# Patient Record
Sex: MALE | Race: WHITE | NOT HISPANIC OR LATINO | Employment: OTHER | URBAN - METROPOLITAN AREA
[De-identification: names, ages, dates, MRNs, and addresses within clinical notes are randomized per-mention and may not be internally consistent; named-entity substitution may affect disease eponyms.]

---

## 2022-03-30 ENCOUNTER — MEDICAL CORRESPONDENCE (OUTPATIENT)
Dept: HEALTH INFORMATION MANAGEMENT | Facility: CLINIC | Age: 77
End: 2022-03-30
Payer: MEDICARE

## 2022-03-31 ENCOUNTER — TRANSCRIBE ORDERS (OUTPATIENT)
Dept: OTHER | Age: 77
End: 2022-03-31

## 2022-03-31 DIAGNOSIS — N28.1 COMPLEX RENAL CYST: ICD-10-CM

## 2022-03-31 DIAGNOSIS — C61 PROSTATE CANCER (H): ICD-10-CM

## 2022-03-31 DIAGNOSIS — C64.1 MALIGNANT NEOPLASM OF RIGHT KIDNEY, EXCEPT RENAL PELVIS (H): Primary | ICD-10-CM

## 2022-03-31 DIAGNOSIS — Z85.46 PERSONAL HISTORY OF MALIGNANT NEOPLASM OF PROSTATE: ICD-10-CM

## 2022-03-31 DIAGNOSIS — N18.31 STAGE 3A CHRONIC KIDNEY DISEASE (H): ICD-10-CM

## 2022-04-01 ENCOUNTER — TELEPHONE (OUTPATIENT)
Dept: UROLOGY | Facility: CLINIC | Age: 77
End: 2022-04-01

## 2022-04-01 NOTE — TELEPHONE ENCOUNTER
[facility] Call Center    Phone Message    May a detailed message be left on voicemail: yes     Reason for Call: Appointment Intake    Referring Provider Name: Andre Orozco  Diagnosis and/or Symptoms:     Malignant neoplasm of right kidney, except renal pelvis (H)  Complex renal cyst  Prostate cancer (H)  Stage 3a chronic kidney disease (H)  Personal history of malignant neoplasm      unsure the best place to schedule patient because Dx's listed fall in different areas of the clinic, so sending encounter message for clinic review and follow-up with patient for scheduling.     Action Taken: Message routed to:  [facility]rology    Travel Screening: Not Applicable

## 2022-04-05 NOTE — TELEPHONE ENCOUNTER
[facility]    Phone Message    May a detailed message be left on voicemail: yes     Reason for Call: Patient is calling in to schedule an appointment based on referral.  Patient would like to request to see Dr. Ridley.  Please reach out to patient.    Action Taken: Message routed to:  [facility] Urology    Travel Screening: Not Applicable

## 2022-04-07 ENCOUNTER — OFFICE VISIT (OUTPATIENT)
Dept: UROLOGY | Facility: CLINIC | Age: 77
End: 2022-04-07
Payer: MEDICARE

## 2022-04-07 DIAGNOSIS — I82.402 ACUTE THROMBOEMBOLISM OF DEEP VEINS OF LEFT LOWER EXTREMITY (H): ICD-10-CM

## 2022-04-07 DIAGNOSIS — N18.31 STAGE 3A CHRONIC KIDNEY DISEASE (H): ICD-10-CM

## 2022-04-07 DIAGNOSIS — C64.1 MALIGNANT NEOPLASM OF RIGHT KIDNEY, EXCEPT RENAL PELVIS (H): Primary | ICD-10-CM

## 2022-04-07 PROCEDURE — 99205 OFFICE O/P NEW HI 60 MIN: CPT | Performed by: UROLOGY

## 2022-04-07 RX ORDER — CETIRIZINE HYDROCHLORIDE 10 MG/1
10 TABLET ORAL DAILY
COMMUNITY

## 2022-04-07 RX ORDER — HYDROXYZINE HYDROCHLORIDE 10 MG/5ML
4 SYRUP ORAL EVERY 6 HOURS PRN
COMMUNITY

## 2022-04-07 RX ORDER — METOPROLOL SUCCINATE 50 MG/1
100 TABLET, EXTENDED RELEASE ORAL DAILY
COMMUNITY
Start: 2021-03-25

## 2022-04-07 RX ORDER — PRAVASTATIN SODIUM 40 MG
1 TABLET ORAL DAILY
COMMUNITY
Start: 2021-11-23

## 2022-04-07 RX ORDER — IRBESARTAN 75 MG/1
75 TABLET ORAL EVERY EVENING
COMMUNITY
Start: 2022-02-08 | End: 2023-02-08

## 2022-04-07 NOTE — NURSING NOTE
Ventura Newby's goals for this visit include:   Chief Complaint   Patient presents with     New Patient     Renal cancer        He requests these members of his care team be copied on today's visit information:     PCP: No Ref-Primary, Physician    Referring Provider:  No referring provider defined for this encounter.    There were no vitals taken for this visit.    Do you need any medication refills at today's visit?     Oly Arce LPN on 4/7/2022 at 10:26 AM

## 2022-04-07 NOTE — PROGRESS NOTES
Urology Consult History and Physical  HERMES ARGUETA   Name: Ventura Newby    MRN: [id number]   YOB: 1945       We were asked to see Ventura Newby at the request of Dr. Angel for evaluation and treatment of RIGHT renal neoplasm.        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 Blunt at [facility] in 2005  Medical history with CKD stage III/IV    Recently found to have a  RIGHT DVT and now on Xarelto since 4/1/2022  He had been driving and sitting a lot for the past few weeks  He is following up with hematology clinic on 4/11/2022           Past Medical History:     Past Medical History:   Diagnosis Date     Stage 3a chronic kidney disease (H) 4/7/2022     As above         Past Surgical History:   No past surgical history on file.   As above         Social History:     Social History     Tobacco Use     Smoking status: Not on file     Smokeless tobacco: Not on file   Substance Use Topics     Alcohol use: Not on file       History   Smoking Status     Not on file   Smokeless Tobacco     Not on file            Family History:   No family history on file.           Allergies:     Allergies   Allergen Reactions     Ace Inhibitors Cough     Increase with dose increase to 30   Usually with a dose > 20 mg   9:55 AM  12/20/2010  Jennie Sow MD            Medications:     Current Outpatient Medications   Medication Sig     cetirizine (ZYRTEC)  10 MG tablet Take 10 mg by mouth     chlorpheniramine (CHLOR-TRIMETON) 4 MG tablet Take 4 mg by mouth     irbesartan (AVAPRO) 75 MG tablet Take 75 mg by mouth     metoprolol succinate ER (TOPROL-XL) 50 MG 24 hr tablet Take 1 tablet by mouth daily     pravastatin (PRAVACHOL) 40 MG tablet Take 1 tablet by mouth daily     rivaroxaban ANTICOAGULANT (XARELTO) 15 MG TABS tablet Take 15 mg by mouth     No current facility-administered medications for this visit.             Review of Systems:     Skin: negative  Eyes: negative  Ears/Nose/Throat: negative  Respiratory: No shortness of breath, dyspnea on exertion, cough, or hemoptysis  Cardiovascular: negative  Gastrointestinal: negative  Genitourinary: as above  Musculoskeletal: negative  Neurologic: negative  Psychiatric: negative  Hematologic/Lymphatic/Immunologic: As above  Endocrine: negative          Physical Exam:   No data found.  There is no height or weight on file to calculate BMI.     General: age-appropriate appearing male in NAD  HEENT: Head AT/NC, EOMI, CN Grossly intact  Lungs: no respiratory distress, or pursed lip breathing  Heart: No obvious jugular venous distension present  Back: no bony midline tenderness, no CVAT bilaterally.  Abdomen: soft, non-distended, non-tender. No organomegaly  Lymph: no palpable inguinal lymphadenopathy.  LE: no edema.   Musculoskeltal: extremities normal, no peripheral edema  Skin: no suspicious lesions or rashes  Neuro: Alert, oriented, speech and mentation normal;  moving all 4 extremities equally.  Psych: affect and mood normal          Data:   All laboratory data reviewed:    OUTSIDE LABS:  Cr:  4/1/22 = 3.03  2/2/22 = 3.19  11/29/21 = 2.72    PSA = <0.1    IMAGING:  All pertinent imaging reviewed:    All imaging studies reviewed by me.  I personally reviewed these imaging films.  A formal report from radiology will follow.    PATHOLOGY:  RENAL BIOPSY 2/24/22:  FINAL DIAGNOSIS     A.  Kidney, right, Superior, needle core  biopsy:    Favor clear cell papillary renal cell carcinoma, see comment     Comment:  The slides were sent to the [facility] and reviewed by Dr. Milli Rojas whose report is below in the supplemental section. Final result communicated to Dr. Andre Orozco on 3/10/2022.         MRI ABDOMEN 11/5/21:  FINDINGS: Examination is limited by lack of IV contrast and patient motion artifact.     There is a 3.7 x 2.7 x 3.0 cm mass redemonstrated in the superior pole the right kidney, not significantly changed in size from prior lumbar CT. This mass is of intermediate subtle density on T1-weighted images and hyperintense on T2-weighted images and contains multiple septations compatible with complex cystic mass. Septal thickening is difficult to confidently ascertained given motion artifact but septations measuring up to at least 3 mm in thickness. Question 4 mm mural nodule series 3 image 23 although this is equivocal.     Numerous additional subcentimeter T2 hyperintense bilateral renal lesions remain too small to characterize, likely cysts.     There are several T2 hyperintense pancreatic lesions, largest 1.0 cm in the pancreatic neck. Liver, gallbladder, adrenals and kidneys unremarkable. No adenopathy.     IMPRESSION::   1. 3.7 x 2.7 x 3.0 cm indeterminate multiseptated right renal cystic mass, not significant change in size compared to prior lumbar spine MRI. Enhancement characteristics are not assessed on this noncontrast exam. While this could reflect a benign complex hemorrhagic/proteinaceous cyst, cystic renal cell carcinoma cannot be entirely excluded. Urology consultation recommended. Six-month follow-up MRI recommended or as otherwise directed by urology service.     2. Few T2 hyperintense pancreatic lesions most suggestive of intraductal papillary mucinous stable lessons, recommend follow-up as below.      U/S RENAL 2/18/22:  FINDINGS:       Right kidney: Measures 9.7 x 5.0 x 4.9 cm. As was demonstrated on the  MRI, there is a complex cyst in the upper pole of the right kidney. This measures 3.9 x 4 x 3.0 cm, slightly increased when correlating with the MRI. There is also blood flow demonstrated within the complex portions of this cystic lesion. The right kidney is otherwise unremarkable.     Left kidney: Measures  9.8 x 5.1 x 4.3 cm. Appears within normal limits.     Urinary bladder: The bladder is only partially distended, but grossly unremarkable.     IMPRESSION: Complex cystic lesion in the upper pole of the right kidney appears slightly larger when correlated with the MRI. There also appears to be blood flow within the complex soft tissue components of the lesion and a cystic renal cell carcinoma cannot be excluded.             Impression and Plan:   Impression:   76-year-old man with a 4 cm right upper pole papillary clear-cell renal cell carcinoma with recent diagnosis of  right calf DVT now on Xarelto and CKD stage III/IV      Plan:   DISCUSSION SMALL RENAL MASS    The diagnosis of renal mass was discussed in great detail.  I reviewed his biopsy results which do indicate papillary clear-cell renal cell carcinoma.    Regarding treatment, we discussed radical vs. partial nephrectomy.  With respect to partial nephrectomy we discussed a potential advantage with preservation of long-term renal function long term and the equivalent long-term cancer control rates with appropriately selected patients.  We discussed the small (~4%) risk of local recurrence and recurrence in the contralateral kidney and the need for long-term radiographic surveillance postoperatively.  We discussed the higher complication rate with open radical vs partial nephrectomy related to a higher risk of prolonged urine leak, urinoma, hemorrhage requiring transfusion, infection, and possible need for reoperation.  We also discussed the potential need for radical nephrectomy based on intraoperative findings.  For radical and partial nephrectomy, we  discussed the risks associated with any major surgery, including cardiovascular, pulmonary, anesthetic, and thromboembolic problems as well as wound healing problems.  We discussed risk of cancer recurrence and the potential need for additional therapy.  We also discussed risk of renal insufficiency and dialysis short and long-term with radical nephrectomy.    We also discussed open vs. robotic-assisted laparoscopic surgery and advantages and disadvantages each way. For laparoscopic surgery, we discussed possibility that conversion to open surgery might be required dependent on intraoperative findings and anatomy.      We also discussed renal ablative therapies such as cryotherapy and RFA.  We reviewed the data for these modalities and the overall encouraging findings thus far, but also discussed the fact that long-term cancer control issues remain unproven due to the still somewhat novel status of these modalities.  As such, the fact that these procedures are still considered experimental was conveyed.  We discussed that given the cystic nature of the mass, I would not recommend ablation therapy    We discussed the role of observation and the low risk of metastases associated with lesions less than 3 cm in size and the potential for slow/stable growth in many cases.  However, the unpredictable natural history of these lesions was mentioned as a drawback with this approach and the lack of effective therapy in the event of systemic progression.  In general, this approach is most favored for those with limited life expectancy and/or those with indeterminate (< 3 cm) renal masses.  Given the biopsy proven diagnosis, I would not recommend observation    We discussed the options for treatment of a localized kidney mass including active surveillance, thermal ablation, and surgical extirpation.  Surgical extirpation has the best track record and close to a 99% chance of cure for T1a lesions compared to 90% cure with  thermal ablation and is generally preferred.  Furthermore, thermal ablation is not optimal for larger masses or centrally located masses.  Active surveillance is also a reasonable option when life expectancy is less than 5-7 years.    The anticipated post-operative course was explained, including an anticipated 1 night hospital stay.    We discussed the impact of his recent lower extremity DVT diagnosis and that we will need to determine the duration of anticoagulation recommended from hematology.  He will update our office after his hematology visit on 4/11/2022.    Patient would like to proceed with a robotic partial nephrectomy when deemed safe given his DVT    The risks, benefits, alternatives, and personnel involved in the procudure were discussed.  All questions were answered.  A written informed consent will be finalized on the morning of the procedure.      Plan:   -Robotic partial nephrectomy with timing pending treatment of his DVT       Thank you for the kind consultation.    Time spent: 30 minutes spent on the date of the encounter doing chart review, history and exam, documentation and further activities as noted above.    Khurram Escobedo MD   Urology  [facility]  [facility] Phone: [phone]  [facility] Phone: [phone]

## 2022-04-20 ENCOUNTER — TELEPHONE (OUTPATIENT)
Dept: UROLOGY | Facility: CLINIC | Age: 77
End: 2022-04-20
Payer: MEDICARE

## 2022-04-20 NOTE — TELEPHONE ENCOUNTER
"Attempted to reach pt.  Left detailed message to call clinic back at [phone].     Per 4/7/22 note: \"We discussed the impact of his recent lower extremity DVT diagnosis and that we will need to determine the duration of anticoagulation recommended from hematology.  He will update our office after his hematology visit on 4/11/2022.\"    Debi Victor RN MSN    "

## 2022-04-20 NOTE — TELEPHONE ENCOUNTER
[facility] Call [facility]    Phone Message    May a detailed message be left on voicemail: yes     Reason for Call: The patient stated he was advised to call and give an update after meeting with his Thrombosis Provider.  He will wait for a call back..  Thank you.     Action Taken: Message routed to:  Adult Clinics: Urology p [id number]    Travel Screening: Not Applicable

## 2022-04-21 NOTE — TELEPHONE ENCOUNTER
Spoke to pt. He reports that Hematology plans to end use of blood thinner in 3 months. Echocardiogram to be done in July. Informed pt this information will be relayed to provider.     Debi Victor RN MSN

## 2022-04-26 ENCOUNTER — PREP FOR PROCEDURE (OUTPATIENT)
Dept: UROLOGY | Facility: CLINIC | Age: 77
End: 2022-04-26
Payer: MEDICARE

## 2022-04-26 DIAGNOSIS — C64.1 MALIGNANT NEOPLASM OF RIGHT KIDNEY, EXCEPT RENAL PELVIS (H): Primary | ICD-10-CM

## 2022-04-26 NOTE — TELEPHONE ENCOUNTER
Khurram Escobedo MD  You; [facility] 10 minutes ago (11:39 AM)     SHAKA Carrera,     Thanks for passing on the information.  I think it makes sense to plan for his surgery in August.  I will put in the surgery order and my [facility] surgery scheduler will.  If he has additional questions or would like to discuss things further with me, I would be happy to have a virtual visit with him anytime before then.     Thanks,   Khurram Diallo M.D., MD Bratsch, Angie J RN  Cc: P [facility]  Caller: Unspecified (6 days ago,  9:46 AM)  Also, I still don't see the MRI images from 11/5/21 and I'll need this pushed through prior to surgery.   Thanks,   Khurram Escobedo M.D.     Attempted to reach pt.  Left detailed message to call clinic back at [phone].     Spoke to  imaging records department and they confirm they will push MRI images to [place] [facility].     Debi Victor, RN MSN

## 2022-04-26 NOTE — TELEPHONE ENCOUNTER
Spoke to pt. Relayed message from provider. Pt verbalized understanding.     Pt reports some incorrect information in 4/7/22 visit note. Will send message to provider for review and addend. Left DVT noted should be corrected to RIGHT DVT.     Debi Victor RN MSN

## 2022-05-29 ENCOUNTER — HEALTH MAINTENANCE LETTER (OUTPATIENT)
Age: 77
End: 2022-05-29

## 2022-07-19 ENCOUNTER — TRANSFERRED RECORDS (OUTPATIENT)
Dept: HEALTH INFORMATION MANAGEMENT | Facility: CLINIC | Age: 77
End: 2022-07-19

## 2022-07-29 ENCOUNTER — TELEPHONE (OUTPATIENT)
Dept: UROLOGY | Facility: CLINIC | Age: 77
End: 2022-07-29

## 2022-07-29 NOTE — TELEPHONE ENCOUNTER
Patient left voicemail message on surgery scheduling line regarding upcoming surgery with Dr. Escobedo on Wednesday. Patient states he is trying to get in contact with Lucina to make sure everything has been received for upcoming surgery.     Message routed for follow up.     Lu Ornelas on 7/29/2022 at 4:42 PM

## 2022-08-01 ENCOUNTER — LAB (OUTPATIENT)
Dept: LAB | Facility: CLINIC | Age: 77
End: 2022-08-01
Payer: MEDICARE

## 2022-08-01 DIAGNOSIS — Z20.822 ENCOUNTER FOR LABORATORY TESTING FOR COVID-19 VIRUS: ICD-10-CM

## 2022-08-01 LAB — SARS-COV-2 RNA RESP QL NAA+PROBE: NEGATIVE

## 2022-08-01 PROCEDURE — U0003 INFECTIOUS AGENT DETECTION BY NUCLEIC ACID (DNA OR RNA); SEVERE ACUTE RESPIRATORY SYNDROME CORONAVIRUS 2 (SARS-COV-2) (CORONAVIRUS DISEASE [COVID-19]), AMPLIFIED PROBE TECHNIQUE, MAKING USE OF HIGH THROUGHPUT TECHNOLOGIES AS DESCRIBED BY CMS-2020-01-R: HCPCS

## 2022-08-01 PROCEDURE — U0005 INFEC AGEN DETEC AMPLI PROBE: HCPCS

## 2022-08-01 RX ORDER — VITAMIN B COMPLEX
2 TABLET ORAL DAILY
COMMUNITY

## 2022-08-01 RX ORDER — FLUTICASONE PROPIONATE 50 MCG
2 SPRAY, SUSPENSION (ML) NASAL DAILY
COMMUNITY

## 2022-08-01 RX ORDER — NIACIN 500 MG
500 TABLET ORAL 2 TIMES DAILY WITH MEALS
COMMUNITY

## 2022-08-01 RX ORDER — ACETAMINOPHEN 500 MG
1000 TABLET ORAL 3 TIMES DAILY PRN
COMMUNITY

## 2022-08-02 NOTE — PROGRESS NOTES
PTA medications updated by Medication Scribe prior to surgery via phone call with patient (last doses completed by Nurse)     Medication history sources: Patient, Surescripts and H&P  In the past week, patient estimated taking medication this percent of the time: Greater than 90%  Adherence assessment: N/A Not Observed    Significant changes made to the medication list:  None      Additional medication history information:   None    Medication reconciliation completed by provider prior to medication history? No    Time spent in this activity: 35 MINUTES    The information provided in this note is only as accurate as the sources available at the time of update(s)      Prior to Admission medications    Medication Sig Last Dose Taking? Auth Provider Long Term End Date   acetaminophen (TYLENOL) 500 MG tablet Take 1,000 mg by mouth 3 times daily as needed for mild pain (500MG X 2 = 1,000MG)  at PRN Yes Reported, Patient     apixaban ANTICOAGULANT (ELIQUIS) 2.5 MG tablet Take 2.5 mg by mouth 2 times daily  at PM Yes Reported, Patient     cetirizine (ZYRTEC) 10 MG tablet Take 10 mg by mouth daily  at PM Yes Reported, Patient     chlorpheniramine (CHLOR-TRIMETON) 4 MG tablet Take 4 mg by mouth every 6 hours as needed for allergies  at PRN Yes Reported, Patient     fluticasone (FLONASE) 50 MCG/ACT nasal spray Spray 2 sprays into both nostrils daily  at PM Yes Reported, Patient     irbesartan (AVAPRO) 75 MG tablet Take 75 mg by mouth every evening  at PM Yes Reported, Patient Yes 2/8/23   ketotifen (ZADITOR) 0.025 % ophthalmic solution Place 1 drop into both eyes 2 times daily as needed (ALLERGIES)  at PRN Yes Reported, Patient No    metoprolol succinate ER (TOPROL-XL) 50 MG 24 hr tablet Take 100 mg by mouth daily (50MG X 2 = 100MG)  at AM Yes Reported, Patient Yes    Multiple Vitamins-Minerals (CENTRUM) TABS Take 1 tablet by mouth daily  at AM Yes Reported, Patient     niacin 500 MG tablet Take 500 mg by mouth 2 times daily  (with meals)  at PM Yes Reported, Patient     pravastatin (PRAVACHOL) 40 MG tablet Take 1 tablet by mouth daily  at PM Yes Reported, Patient Yes    Vitamin D3 (CHOLECALCIFEROL) 25 mcg (1000 units) tablet Take 2 tablets by mouth daily (2X 1,000IU = 2,000IU)  at AM Yes Reported, Patient

## 2022-08-03 ENCOUNTER — HOSPITAL ENCOUNTER (OUTPATIENT)
Facility: CLINIC | Age: 77
Discharge: HOME | End: 2022-08-04
Attending: UROLOGY | Admitting: UROLOGY
Payer: MEDICARE

## 2022-08-03 ENCOUNTER — ANESTHESIA (OUTPATIENT)
Dept: SURGERY | Facility: CLINIC | Age: 77
End: 2022-08-03
Payer: MEDICARE

## 2022-08-03 ENCOUNTER — ANESTHESIA EVENT (OUTPATIENT)
Dept: SURGERY | Facility: CLINIC | Age: 77
End: 2022-08-03
Payer: MEDICARE

## 2022-08-03 DIAGNOSIS — C64.1 CLEAR CELL RENAL CELL CARCINOMA, RIGHT (H): ICD-10-CM

## 2022-08-03 DIAGNOSIS — N18.31 STAGE 3A CHRONIC KIDNEY DISEASE (H): ICD-10-CM

## 2022-08-03 DIAGNOSIS — C64.1 MALIGNANT NEOPLASM OF RIGHT KIDNEY, EXCEPT RENAL PELVIS (H): Primary | ICD-10-CM

## 2022-08-03 LAB
CREAT SERPL-MCNC: 3.77 MG/DL (ref 0.66–1.25)
GFR SERPL CREATININE-BSD FRML MDRD: 16 ML/MIN/1.73M2
GLUCOSE BLD-MCNC: 103 MG/DL (ref 70–99)
HOLD SPECIMEN: NORMAL
PLATELET # BLD AUTO: 191 10E3/UL (ref 150–450)
POTASSIUM BLD-SCNC: 4.1 MMOL/L (ref 3.4–5.3)

## 2022-08-03 PROCEDURE — 85049 AUTOMATED PLATELET COUNT: CPT | Performed by: UROLOGY

## 2022-08-03 PROCEDURE — 250N000025 HC SEVOFLURANE, PER MIN: Performed by: UROLOGY

## 2022-08-03 PROCEDURE — 50543 LAPARO PARTIAL NEPHRECTOMY: CPT | Mod: RT | Performed by: UROLOGY

## 2022-08-03 PROCEDURE — 250N000011 HC RX IP 250 OP 636: Performed by: STUDENT IN AN ORGANIZED HEALTH CARE EDUCATION/TRAINING PROGRAM

## 2022-08-03 PROCEDURE — 710N000009 HC RECOVERY PHASE 1, LEVEL 1, PER MIN: Performed by: UROLOGY

## 2022-08-03 PROCEDURE — P9045 ALBUMIN (HUMAN), 5%, 250 ML: HCPCS | Performed by: NURSE ANESTHETIST, CERTIFIED REGISTERED

## 2022-08-03 PROCEDURE — 36415 COLL VENOUS BLD VENIPUNCTURE: CPT | Performed by: UROLOGY

## 2022-08-03 PROCEDURE — 84132 ASSAY OF SERUM POTASSIUM: CPT | Performed by: ANESTHESIOLOGY

## 2022-08-03 PROCEDURE — 258N000001 HC RX 258: Performed by: UROLOGY

## 2022-08-03 PROCEDURE — 250N000011 HC RX IP 250 OP 636: Performed by: NURSE ANESTHETIST, CERTIFIED REGISTERED

## 2022-08-03 PROCEDURE — 258N000003 HC RX IP 258 OP 636: Performed by: ANESTHESIOLOGY

## 2022-08-03 PROCEDURE — 258N000003 HC RX IP 258 OP 636: Performed by: NURSE ANESTHETIST, CERTIFIED REGISTERED

## 2022-08-03 PROCEDURE — 88307 TISSUE EXAM BY PATHOLOGIST: CPT | Mod: TC | Performed by: UROLOGY

## 2022-08-03 PROCEDURE — 250N000009 HC RX 250: Performed by: UROLOGY

## 2022-08-03 PROCEDURE — 250N000009 HC RX 250: Performed by: NURSE ANESTHETIST, CERTIFIED REGISTERED

## 2022-08-03 PROCEDURE — 999N000141 HC STATISTIC PRE-PROCEDURE NURSING ASSESSMENT: Performed by: UROLOGY

## 2022-08-03 PROCEDURE — 82565 ASSAY OF CREATININE: CPT | Performed by: ANESTHESIOLOGY

## 2022-08-03 PROCEDURE — 272N000001 HC OR GENERAL SUPPLY STERILE: Performed by: UROLOGY

## 2022-08-03 PROCEDURE — 76770 US EXAM ABDO BACK WALL COMP: CPT | Mod: 26 | Performed by: UROLOGY

## 2022-08-03 PROCEDURE — 258N000003 HC RX IP 258 OP 636: Performed by: STUDENT IN AN ORGANIZED HEALTH CARE EDUCATION/TRAINING PROGRAM

## 2022-08-03 PROCEDURE — 360N000080 HC SURGERY LEVEL 7, PER MIN: Performed by: UROLOGY

## 2022-08-03 PROCEDURE — 250N000011 HC RX IP 250 OP 636: Performed by: UROLOGY

## 2022-08-03 PROCEDURE — 370N000017 HC ANESTHESIA TECHNICAL FEE, PER MIN: Performed by: UROLOGY

## 2022-08-03 PROCEDURE — 88342 IMHCHEM/IMCYTCHM 1ST ANTB: CPT | Mod: TC | Performed by: UROLOGY

## 2022-08-03 PROCEDURE — 250N000013 HC RX MED GY IP 250 OP 250 PS 637: Performed by: ANESTHESIOLOGY

## 2022-08-03 PROCEDURE — 82947 ASSAY GLUCOSE BLOOD QUANT: CPT | Performed by: ANESTHESIOLOGY

## 2022-08-03 PROCEDURE — 250N000013 HC RX MED GY IP 250 OP 250 PS 637: Performed by: STUDENT IN AN ORGANIZED HEALTH CARE EDUCATION/TRAINING PROGRAM

## 2022-08-03 PROCEDURE — 96372 THER/PROPH/DIAG INJ SC/IM: CPT | Performed by: STUDENT IN AN ORGANIZED HEALTH CARE EDUCATION/TRAINING PROGRAM

## 2022-08-03 RX ORDER — SODIUM CHLORIDE 9 MG/ML
INJECTION, SOLUTION INTRAVENOUS CONTINUOUS
Status: DISCONTINUED | OUTPATIENT
Start: 2022-08-03 | End: 2022-08-04

## 2022-08-03 RX ORDER — SODIUM CHLORIDE, SODIUM LACTATE, POTASSIUM CHLORIDE, CALCIUM CHLORIDE 600; 310; 30; 20 MG/100ML; MG/100ML; MG/100ML; MG/100ML
INJECTION, SOLUTION INTRAVENOUS CONTINUOUS
Status: DISCONTINUED | OUTPATIENT
Start: 2022-08-03 | End: 2022-08-03 | Stop reason: HOSPADM

## 2022-08-03 RX ORDER — DEXAMETHASONE SODIUM PHOSPHATE 4 MG/ML
INJECTION, SOLUTION INTRA-ARTICULAR; INTRALESIONAL; INTRAMUSCULAR; INTRAVENOUS; SOFT TISSUE PRN
Status: DISCONTINUED | OUTPATIENT
Start: 2022-08-03 | End: 2022-08-03

## 2022-08-03 RX ORDER — OXYCODONE HYDROCHLORIDE 5 MG/1
5 TABLET ORAL EVERY 4 HOURS PRN
Status: DISCONTINUED | OUTPATIENT
Start: 2022-08-03 | End: 2022-08-03

## 2022-08-03 RX ORDER — FENTANYL CITRATE 0.05 MG/ML
25 INJECTION, SOLUTION INTRAMUSCULAR; INTRAVENOUS EVERY 5 MIN PRN
Status: DISCONTINUED | OUTPATIENT
Start: 2022-08-03 | End: 2022-08-03 | Stop reason: HOSPADM

## 2022-08-03 RX ORDER — FENTANYL CITRATE 50 UG/ML
INJECTION, SOLUTION INTRAMUSCULAR; INTRAVENOUS PRN
Status: DISCONTINUED | OUTPATIENT
Start: 2022-08-03 | End: 2022-08-03

## 2022-08-03 RX ORDER — AMOXICILLIN 250 MG
1 CAPSULE ORAL 2 TIMES DAILY
Status: DISCONTINUED | OUTPATIENT
Start: 2022-08-03 | End: 2022-08-04 | Stop reason: HOSPADM

## 2022-08-03 RX ORDER — NALOXONE HYDROCHLORIDE 0.4 MG/ML
0.2 INJECTION, SOLUTION INTRAMUSCULAR; INTRAVENOUS; SUBCUTANEOUS
Status: DISCONTINUED | OUTPATIENT
Start: 2022-08-03 | End: 2022-08-04 | Stop reason: HOSPADM

## 2022-08-03 RX ORDER — ASPIRIN 81 MG
100 TABLET, DELAYED RELEASE (ENTERIC COATED) ORAL DAILY
Qty: 30 TABLET | Refills: 0 | Status: SHIPPED | OUTPATIENT
Start: 2022-08-03 | End: 2022-09-02

## 2022-08-03 RX ORDER — ATROPA BELLADONNA AND OPIUM 16.2; 3 MG/1; MG/1
30 SUPPOSITORY RECTAL 3 TIMES DAILY PRN
Status: DISCONTINUED | OUTPATIENT
Start: 2022-08-03 | End: 2022-08-04 | Stop reason: HOSPADM

## 2022-08-03 RX ORDER — PRAVASTATIN SODIUM 40 MG
40 TABLET ORAL DAILY
Status: DISCONTINUED | OUTPATIENT
Start: 2022-08-03 | End: 2022-08-04 | Stop reason: HOSPADM

## 2022-08-03 RX ORDER — NALOXONE HYDROCHLORIDE 0.4 MG/ML
0.4 INJECTION, SOLUTION INTRAMUSCULAR; INTRAVENOUS; SUBCUTANEOUS
Status: DISCONTINUED | OUTPATIENT
Start: 2022-08-03 | End: 2022-08-04 | Stop reason: HOSPADM

## 2022-08-03 RX ORDER — CEFAZOLIN SODIUM/WATER 2 G/20 ML
2 SYRINGE (ML) INTRAVENOUS SEE ADMIN INSTRUCTIONS
Status: DISCONTINUED | OUTPATIENT
Start: 2022-08-03 | End: 2022-08-03 | Stop reason: HOSPADM

## 2022-08-03 RX ORDER — BUPIVACAINE HYDROCHLORIDE 2.5 MG/ML
INJECTION, SOLUTION EPIDURAL; INFILTRATION; INTRACAUDAL PRN
Status: DISCONTINUED | OUTPATIENT
Start: 2022-08-03 | End: 2022-08-03 | Stop reason: HOSPADM

## 2022-08-03 RX ORDER — FLUTICASONE PROPIONATE 50 MCG
2 SPRAY, SUSPENSION (ML) NASAL DAILY
Status: DISCONTINUED | OUTPATIENT
Start: 2022-08-03 | End: 2022-08-04 | Stop reason: HOSPADM

## 2022-08-03 RX ORDER — ONDANSETRON 4 MG/1
4 TABLET, ORALLY DISINTEGRATING ORAL EVERY 6 HOURS PRN
Status: DISCONTINUED | OUTPATIENT
Start: 2022-08-03 | End: 2022-08-04 | Stop reason: HOSPADM

## 2022-08-03 RX ORDER — ONDANSETRON 4 MG/1
4 TABLET, ORALLY DISINTEGRATING ORAL EVERY 30 MIN PRN
Status: DISCONTINUED | OUTPATIENT
Start: 2022-08-03 | End: 2022-08-03 | Stop reason: HOSPADM

## 2022-08-03 RX ORDER — CHOLECALCIFEROL (VITAMIN D3) 50 MCG
50 TABLET ORAL DAILY
Status: DISCONTINUED | OUTPATIENT
Start: 2022-08-03 | End: 2022-08-04 | Stop reason: HOSPADM

## 2022-08-03 RX ORDER — HYDROXYZINE HYDROCHLORIDE 10 MG/5ML
4 SYRUP ORAL EVERY 6 HOURS PRN
Status: DISCONTINUED | OUTPATIENT
Start: 2022-08-03 | End: 2022-08-04 | Stop reason: HOSPADM

## 2022-08-03 RX ORDER — METOPROLOL SUCCINATE 100 MG/1
100 TABLET, EXTENDED RELEASE ORAL DAILY
Status: DISCONTINUED | OUTPATIENT
Start: 2022-08-04 | End: 2022-08-04 | Stop reason: HOSPADM

## 2022-08-03 RX ORDER — LIDOCAINE 50 MG/G
OINTMENT TOPICAL 4 TIMES DAILY PRN
Status: DISCONTINUED | OUTPATIENT
Start: 2022-08-03 | End: 2022-08-04 | Stop reason: HOSPADM

## 2022-08-03 RX ORDER — ACETAMINOPHEN 500 MG
1000 TABLET ORAL ONCE
Status: COMPLETED | OUTPATIENT
Start: 2022-08-03 | End: 2022-08-03

## 2022-08-03 RX ORDER — ONDANSETRON 2 MG/ML
4 INJECTION INTRAMUSCULAR; INTRAVENOUS EVERY 6 HOURS PRN
Status: DISCONTINUED | OUTPATIENT
Start: 2022-08-03 | End: 2022-08-04 | Stop reason: HOSPADM

## 2022-08-03 RX ORDER — HYDROMORPHONE HCL IN WATER/PF 6 MG/30 ML
0.2 PATIENT CONTROLLED ANALGESIA SYRINGE INTRAVENOUS
Status: DISCONTINUED | OUTPATIENT
Start: 2022-08-03 | End: 2022-08-04 | Stop reason: HOSPADM

## 2022-08-03 RX ORDER — ACETAMINOPHEN 325 MG/1
650 TABLET ORAL EVERY 6 HOURS
Status: DISCONTINUED | OUTPATIENT
Start: 2022-08-03 | End: 2022-08-04 | Stop reason: HOSPADM

## 2022-08-03 RX ORDER — ONDANSETRON 2 MG/ML
4 INJECTION INTRAMUSCULAR; INTRAVENOUS EVERY 30 MIN PRN
Status: DISCONTINUED | OUTPATIENT
Start: 2022-08-03 | End: 2022-08-03 | Stop reason: HOSPADM

## 2022-08-03 RX ORDER — HYDRALAZINE HYDROCHLORIDE 20 MG/ML
10 INJECTION INTRAMUSCULAR; INTRAVENOUS EVERY 6 HOURS PRN
Status: DISCONTINUED | OUTPATIENT
Start: 2022-08-03 | End: 2022-08-04 | Stop reason: HOSPADM

## 2022-08-03 RX ORDER — SODIUM CHLORIDE, SODIUM LACTATE, POTASSIUM CHLORIDE, CALCIUM CHLORIDE 600; 310; 30; 20 MG/100ML; MG/100ML; MG/100ML; MG/100ML
INJECTION, SOLUTION INTRAVENOUS CONTINUOUS PRN
Status: DISCONTINUED | OUTPATIENT
Start: 2022-08-03 | End: 2022-08-03

## 2022-08-03 RX ORDER — NIACIN 500 MG
500 TABLET ORAL 2 TIMES DAILY WITH MEALS
Status: DISCONTINUED | OUTPATIENT
Start: 2022-08-03 | End: 2022-08-04 | Stop reason: HOSPADM

## 2022-08-03 RX ORDER — PROPOFOL 10 MG/ML
INJECTION, EMULSION INTRAVENOUS CONTINUOUS PRN
Status: DISCONTINUED | OUTPATIENT
Start: 2022-08-03 | End: 2022-08-03

## 2022-08-03 RX ORDER — HEPARIN SODIUM 5000 [USP'U]/.5ML
5000 INJECTION, SOLUTION INTRAVENOUS; SUBCUTANEOUS EVERY 12 HOURS
Status: DISCONTINUED | OUTPATIENT
Start: 2022-08-03 | End: 2022-08-04 | Stop reason: HOSPADM

## 2022-08-03 RX ORDER — LABETALOL HYDROCHLORIDE 5 MG/ML
20 INJECTION, SOLUTION INTRAVENOUS EVERY 4 HOURS PRN
Status: DISCONTINUED | OUTPATIENT
Start: 2022-08-03 | End: 2022-08-04 | Stop reason: HOSPADM

## 2022-08-03 RX ORDER — CETIRIZINE HYDROCHLORIDE 10 MG/1
10 TABLET ORAL DAILY
Status: DISCONTINUED | OUTPATIENT
Start: 2022-08-03 | End: 2022-08-04 | Stop reason: HOSPADM

## 2022-08-03 RX ORDER — LIDOCAINE 40 MG/G
CREAM TOPICAL
Status: DISCONTINUED | OUTPATIENT
Start: 2022-08-03 | End: 2022-08-04 | Stop reason: HOSPADM

## 2022-08-03 RX ORDER — CEFAZOLIN SODIUM/WATER 2 G/20 ML
2 SYRINGE (ML) INTRAVENOUS
Status: COMPLETED | OUTPATIENT
Start: 2022-08-03 | End: 2022-08-03

## 2022-08-03 RX ORDER — MAGNESIUM HYDROXIDE 1200 MG/15ML
LIQUID ORAL PRN
Status: DISCONTINUED | OUTPATIENT
Start: 2022-08-03 | End: 2022-08-03 | Stop reason: HOSPADM

## 2022-08-03 RX ORDER — HYDROMORPHONE HCL IN WATER/PF 6 MG/30 ML
0.2 PATIENT CONTROLLED ANALGESIA SYRINGE INTRAVENOUS EVERY 5 MIN PRN
Status: DISCONTINUED | OUTPATIENT
Start: 2022-08-03 | End: 2022-08-03 | Stop reason: HOSPADM

## 2022-08-03 RX ORDER — IRBESARTAN 75 MG/1
75 TABLET ORAL EVERY EVENING
Status: DISCONTINUED | OUTPATIENT
Start: 2022-08-03 | End: 2022-08-04 | Stop reason: HOSPADM

## 2022-08-03 RX ORDER — LIDOCAINE HYDROCHLORIDE 20 MG/ML
INJECTION, SOLUTION INFILTRATION; PERINEURAL PRN
Status: DISCONTINUED | OUTPATIENT
Start: 2022-08-03 | End: 2022-08-03

## 2022-08-03 RX ORDER — OXYCODONE HYDROCHLORIDE 5 MG/1
5 TABLET ORAL EVERY 6 HOURS PRN
Qty: 9 TABLET | Refills: 0 | Status: SHIPPED | OUTPATIENT
Start: 2022-08-03 | End: 2022-08-06

## 2022-08-03 RX ORDER — OXYCODONE HYDROCHLORIDE 5 MG/1
5 TABLET ORAL EVERY 4 HOURS PRN
Status: DISCONTINUED | OUTPATIENT
Start: 2022-08-03 | End: 2022-08-04 | Stop reason: HOSPADM

## 2022-08-03 RX ORDER — ONDANSETRON 2 MG/ML
INJECTION INTRAMUSCULAR; INTRAVENOUS PRN
Status: DISCONTINUED | OUTPATIENT
Start: 2022-08-03 | End: 2022-08-03

## 2022-08-03 RX ORDER — NEOMYCIN/BACITRACIN/POLYMYXINB 3.5-400-5K
OINTMENT (GRAM) TOPICAL 4 TIMES DAILY PRN
Status: DISCONTINUED | OUTPATIENT
Start: 2022-08-03 | End: 2022-08-04 | Stop reason: HOSPADM

## 2022-08-03 RX ORDER — PROCHLORPERAZINE MALEATE 5 MG
5 TABLET ORAL EVERY 6 HOURS PRN
Status: DISCONTINUED | OUTPATIENT
Start: 2022-08-03 | End: 2022-08-04 | Stop reason: HOSPADM

## 2022-08-03 RX ORDER — POLYETHYLENE GLYCOL 3350 17 G/17G
17 POWDER, FOR SOLUTION ORAL DAILY
Status: DISCONTINUED | OUTPATIENT
Start: 2022-08-03 | End: 2022-08-04 | Stop reason: HOSPADM

## 2022-08-03 RX ORDER — EPHEDRINE SULFATE 50 MG/ML
INJECTION, SOLUTION INTRAMUSCULAR; INTRAVENOUS; SUBCUTANEOUS PRN
Status: DISCONTINUED | OUTPATIENT
Start: 2022-08-03 | End: 2022-08-03

## 2022-08-03 RX ORDER — PROPOFOL 10 MG/ML
INJECTION, EMULSION INTRAVENOUS PRN
Status: DISCONTINUED | OUTPATIENT
Start: 2022-08-03 | End: 2022-08-03

## 2022-08-03 RX ADMIN — PHENYLEPHRINE HYDROCHLORIDE 0.5 MCG/KG/MIN: 10 INJECTION INTRAVENOUS at 08:20

## 2022-08-03 RX ADMIN — PHENYLEPHRINE HYDROCHLORIDE 50 MCG: 10 INJECTION INTRAVENOUS at 11:17

## 2022-08-03 RX ADMIN — FLUTICASONE PROPIONATE 2 SPRAY: 50 SPRAY, METERED NASAL at 17:38

## 2022-08-03 RX ADMIN — OXYCODONE HYDROCHLORIDE 2.5 MG: 5 TABLET ORAL at 14:02

## 2022-08-03 RX ADMIN — FENTANYL CITRATE 25 MCG: 50 INJECTION, SOLUTION INTRAMUSCULAR; INTRAVENOUS at 07:47

## 2022-08-03 RX ADMIN — Medication 2 G: at 07:40

## 2022-08-03 RX ADMIN — OXYCODONE HYDROCHLORIDE 5 MG: 5 TABLET ORAL at 17:33

## 2022-08-03 RX ADMIN — PHENYLEPHRINE HYDROCHLORIDE 100 MCG: 10 INJECTION INTRAVENOUS at 08:14

## 2022-08-03 RX ADMIN — DEXAMETHASONE SODIUM PHOSPHATE 8 MG: 4 INJECTION, SOLUTION INTRA-ARTICULAR; INTRALESIONAL; INTRAMUSCULAR; INTRAVENOUS; SOFT TISSUE at 07:41

## 2022-08-03 RX ADMIN — PHENYLEPHRINE HYDROCHLORIDE 100 MCG: 10 INJECTION INTRAVENOUS at 08:01

## 2022-08-03 RX ADMIN — CETIRIZINE HYDROCHLORIDE 10 MG: 10 TABLET, FILM COATED ORAL at 17:33

## 2022-08-03 RX ADMIN — SODIUM CHLORIDE: 9 INJECTION, SOLUTION INTRAVENOUS at 22:53

## 2022-08-03 RX ADMIN — PRAVASTATIN SODIUM 40 MG: 40 TABLET ORAL at 20:40

## 2022-08-03 RX ADMIN — PROPOFOL 20 MCG/KG/MIN: 10 INJECTION, EMULSION INTRAVENOUS at 08:39

## 2022-08-03 RX ADMIN — ROCURONIUM BROMIDE 5 MG: 50 INJECTION, SOLUTION INTRAVENOUS at 10:47

## 2022-08-03 RX ADMIN — Medication 10 MG: at 08:04

## 2022-08-03 RX ADMIN — DEXMEDETOMIDINE HYDROCHLORIDE 0.2 MCG/KG/HR: 100 INJECTION, SOLUTION INTRAVENOUS at 08:30

## 2022-08-03 RX ADMIN — SENNOSIDES AND DOCUSATE SODIUM 1 TABLET: 50; 8.6 TABLET ORAL at 20:40

## 2022-08-03 RX ADMIN — Medication 5 MG: at 10:45

## 2022-08-03 RX ADMIN — SODIUM CHLORIDE, POTASSIUM CHLORIDE, SODIUM LACTATE AND CALCIUM CHLORIDE: 600; 310; 30; 20 INJECTION, SOLUTION INTRAVENOUS at 06:49

## 2022-08-03 RX ADMIN — Medication 500 MG: at 18:16

## 2022-08-03 RX ADMIN — PROPOFOL 250 MG: 10 INJECTION, EMULSION INTRAVENOUS at 07:41

## 2022-08-03 RX ADMIN — ONDANSETRON 4 MG: 2 INJECTION INTRAMUSCULAR; INTRAVENOUS at 07:41

## 2022-08-03 RX ADMIN — ALBUMIN HUMAN: 0.05 INJECTION, SOLUTION INTRAVENOUS at 08:30

## 2022-08-03 RX ADMIN — Medication 5 MG: at 10:27

## 2022-08-03 RX ADMIN — PHENYLEPHRINE HYDROCHLORIDE 100 MCG: 10 INJECTION INTRAVENOUS at 09:53

## 2022-08-03 RX ADMIN — ACETAMINOPHEN 650 MG: 325 TABLET ORAL at 20:40

## 2022-08-03 RX ADMIN — PHENYLEPHRINE HYDROCHLORIDE 100 MCG: 10 INJECTION INTRAVENOUS at 08:04

## 2022-08-03 RX ADMIN — HYDROMORPHONE HYDROCHLORIDE 0.5 MG: 1 INJECTION, SOLUTION INTRAMUSCULAR; INTRAVENOUS; SUBCUTANEOUS at 09:47

## 2022-08-03 RX ADMIN — OXYCODONE HYDROCHLORIDE 5 MG: 5 TABLET ORAL at 22:52

## 2022-08-03 RX ADMIN — PHENYLEPHRINE HYDROCHLORIDE 100 MCG: 10 INJECTION INTRAVENOUS at 10:02

## 2022-08-03 RX ADMIN — SODIUM CHLORIDE, POTASSIUM CHLORIDE, SODIUM LACTATE AND CALCIUM CHLORIDE: 600; 310; 30; 20 INJECTION, SOLUTION INTRAVENOUS at 10:40

## 2022-08-03 RX ADMIN — Medication 50 MCG: at 17:33

## 2022-08-03 RX ADMIN — ACETAMINOPHEN 1000 MG: 500 TABLET ORAL at 07:20

## 2022-08-03 RX ADMIN — SODIUM CHLORIDE: 9 INJECTION, SOLUTION INTRAVENOUS at 14:06

## 2022-08-03 RX ADMIN — POLYETHYLENE GLYCOL 3350 17 G: 17 POWDER, FOR SOLUTION ORAL at 17:33

## 2022-08-03 RX ADMIN — FENTANYL CITRATE 25 MCG: 50 INJECTION, SOLUTION INTRAMUSCULAR; INTRAVENOUS at 07:42

## 2022-08-03 RX ADMIN — ROCURONIUM BROMIDE 50 MG: 50 INJECTION, SOLUTION INTRAVENOUS at 07:41

## 2022-08-03 RX ADMIN — ACETAMINOPHEN 650 MG: 325 TABLET ORAL at 14:06

## 2022-08-03 RX ADMIN — HEPARIN SODIUM 5000 UNITS: 5000 INJECTION, SOLUTION INTRAVENOUS; SUBCUTANEOUS at 20:39

## 2022-08-03 RX ADMIN — SUGAMMADEX 200 MG: 100 INJECTION, SOLUTION INTRAVENOUS at 11:19

## 2022-08-03 RX ADMIN — PHENYLEPHRINE HYDROCHLORIDE 100 MCG: 10 INJECTION INTRAVENOUS at 08:06

## 2022-08-03 RX ADMIN — SODIUM CHLORIDE, SODIUM LACTATE, POTASSIUM CHLORIDE, CALCIUM CHLORIDE: 600; 310; 30; 20 INJECTION, SOLUTION INTRAVENOUS at 07:46

## 2022-08-03 RX ADMIN — PHENYLEPHRINE HYDROCHLORIDE 100 MCG: 10 INJECTION INTRAVENOUS at 08:17

## 2022-08-03 RX ADMIN — LIDOCAINE HYDROCHLORIDE 100 MG: 20 INJECTION, SOLUTION INFILTRATION; PERINEURAL at 07:41

## 2022-08-03 RX ADMIN — ROCURONIUM BROMIDE 20 MG: 50 INJECTION, SOLUTION INTRAVENOUS at 08:20

## 2022-08-03 RX ADMIN — FENTANYL CITRATE 50 MCG: 50 INJECTION, SOLUTION INTRAMUSCULAR; INTRAVENOUS at 09:45

## 2022-08-03 ASSESSMENT — ENCOUNTER SYMPTOMS: DYSRHYTHMIAS: 0

## 2022-08-03 ASSESSMENT — ACTIVITIES OF DAILY LIVING (ADL)
ADLS_ACUITY_SCORE: 20

## 2022-08-03 ASSESSMENT — LIFESTYLE VARIABLES: TOBACCO_USE: 0

## 2022-08-03 ASSESSMENT — COPD QUESTIONNAIRES: COPD: 0

## 2022-08-03 NOTE — PLAN OF CARE
Goal Outcome Evaluation:    Pt A&O x4. VSS on RA. Pain managed with Oxy 5mg. Denies nausea/vomit. Arnold in place with good output. Ambulated in the room. 4 lap sites, with liquid bandage. Ernesto to the RLQ, with bloody output. Tolerating full liquid diet. Up with SBA. Continue to monitor

## 2022-08-03 NOTE — ANESTHESIA POSTPROCEDURE EVALUATION
Patient: Ventura Newby    Procedure: Procedure(s):  ROBOTIC ASSISTED LAPAROSCOPIC RIGHT PARTIAL NEPHRECTOMY WITH INTRAOPERATIVE RENAL ULTRASOUND       Anesthesia Type:  General    Note:  Disposition: Inpatient   Postop Pain Control: Uneventful            Sign Out: Well controlled pain   PONV:    Neuro/Psych: Uneventful            Sign Out: Acceptable/Baseline neuro status   Airway/Respiratory: Uneventful            Sign Out: Acceptable/Baseline resp. status   CV/Hemodynamics: Uneventful            Sign Out: Acceptable CV status   Other NRE: NONE   DID A NON-ROUTINE EVENT OCCUR? No           Last vitals:  Vitals Value Taken Time   /82 08/03/22 1240   Temp 36.6  C (97.9  F) 08/03/22 1200   Pulse 88 08/03/22 1250   Resp 12 08/03/22 1250   SpO2 97 % 08/03/22 1248   Vitals shown include unvalidated device data.    Electronically Signed By: Bhupinder Victoria MD  August 3, 2022  2:43 PM

## 2022-08-03 NOTE — OP NOTE
OPERATIVE REPORT  DATE OF SURGERY: 08/03/22  LOCATION OF SURGERY: [facility] OR  PREOPERATIVE DIAGNOSIS:  (C64.1) Malignant neoplasm of right kidney, except renal pelvis (H)  (primary encounter diagnosis)  (N18.31) Stage 3a chronic kidney disease (H)  (C64.1) Clear cell renal cell carcinoma, right (H)  POSTOPERATIVE DIAGNOSIS: (C64.1) Malignant neoplasm of right kidney, except renal pelvis (H)  (primary encounter diagnosis)  (N18.31) Stage 3a chronic kidney disease (H)  (C64.1) Clear cell renal cell carcinoma, right (H)     START TIME: 8:15 AM  END TIME: 11:11 AM     PROCEDURE PERFORMED:   1. ROBOTIC ASSISTED LAPAROSCOPIC PARTIAL NEPHRECTOMY - RIGHT  2. ROBOTIC ASSISTED LAPAROSCOPIC INTRA-OPERATIVE RENAL ULTRASOUND      STAFF SURGEON: Khurram Escobedo MD  RESIDENT SURGEON: Carlos Peace MD  ANESTHESIA: General.   ESTIMATED BLOOD LOSS: 30 mL.   DRAINS AND TUBES: 16fr bronson catheter, 19fr Javon drain  COMPLICATIONS: None.   DISPOSITION: PACU.   SPECIMENS OBTAINED:   ID Type Source Tests Collected by Time Destination   1 : RIGHT RENAL NEOPLASM Tissue Kidney, Right SURGICAL PATHOLOGY EXAM Khurram Escobedo MD 8/3/2022 10:57 AM      SIGNIFICANT FINDINGS: RIGHT upper pole posterior cystic clear cell renal cell carcinoma removed with grosly negative margins.     HISTORY OF PRESENT ILLNESS: Ventura Newby is a 76 year old man found to have a complex RIGHT renal cyst on work up for back pain. This was biopsied and consistent with a papillary clear-cell renal cell carcinoma. He developed a right calf DVT and surgery was delayed to allow for several months of anti-coagulation. He does have CKD III/IV. He was counseled on treatment options and elected to proceed.     OPERATION PERFORMED:   Informed consent was obtained and the patient was brought to the operating room where general anesthesia was induced. The patient was given appropriate preoperative antibiotics and positioned supine. We then performed a timeout, verifying the  correct patient's site and procedure to be performed.    The patient was then placed in the lateral flank position with RIGHT side up in preparation for a RIGHT-sided partial nephrectomy. We prepped and draped in the usual sterile fashion, and then after being prepped and draped in the usual sterile fashion, we gained access to the abdomen for CO2 insufflation with a Veress needle of the lateral border of the rectus muscle in line with the 11th rib. After access was obtained, the 8-mm port was inserted and the camera was inserted. No adhesions were noted. An 8-mm port was placed one hand's breath cranial, 1-2 fingers below the costal margin. A finder needle was used to identify the optimal site and angle for the liver retractor and a 5mm port was placed medial and cranial to the robotic port for this. Two 8-mm ports were placed in the RIGHT lower quadrant, medial and anterior to the anterior iliac spine.  We then placed a 12-mm assistant port. The robot was then docked, and the ascending colon was dropped off the lateral wall. The liver was retracted and held in place with a locking Nyla clamp. The plane was found between the mesentery and Gerota's fascia. The duodenum was partially kocherized and the IVC was identified. The ureter was identified and the gonadal vein was identified.  We were able to follow this up to the renal vein. The was a small branch of the gonadal vein joining to the renal vein which was clipped and divided. Dissection was carried to the hilum and both the renal artery and vein were exposed. Care was taken to ensure the artery was dissected adequately for clamp placement. The kidney was then defatted and a bulge was noted at the posterior upper pole.  The renal ultrasound was used to delineate the mass and the renal capsule was scored, marking the outline of the tumor. A single bulldog clamp was placed on the artery. The partial nephrectomy was then performed, removing the tumor with grossly  negative margin.  Hemostasis was maintained in the nephrectomy bed was running 2-0 SH vicryl suture. The renorrhaphy was closed with a 0 CT-1 Vicryl suture through the capsule. Capsular sutures were secured with Weck clips. The final end was doubly clipped. The bulldog clamp was then removed and hemostasis was checked, which was excellent. Ischemia time was 18 minutes. Pneumoperitoneum was lowered and there was no bleeding from the renorrhaphy site. The specimen was placed in an endocatch bag. A drain was placed in the lateral-most port. The robot was undocked. The 12-mm assistant port was decided to be our extraction site. The assistant port was then extended. Extraction incision was closed with 0-PDS. The remainder of the skin incisions were irrigated out and closed subcuticularly using 4-0 Vicryl suture and covered with skin glue. Dressings were applied.     The patient was awoken from anesthesia and taken to PACU in stable condition.    Khurram Escobedo MD   Urology  [facility]  Clinic Phone [phone]

## 2022-08-03 NOTE — ANESTHESIA PREPROCEDURE EVALUATION
"Anesthesia Pre-Procedure Evaluation    Patient: Ventura Newby   MRN: [id number] : 1945        Procedure : Procedure(s):  ROBOTIC ASSISTED LAPAROSCOPIC RIGHT PARTIAL NEPHRECTOMY POSSIBLE OPEN          Past Medical History:   Diagnosis Date     Diabetes (H)      Stage 3a chronic kidney disease (H) 2022      Past Surgical History:   Procedure Laterality Date     HERNIA REPAIR       ORTHOPEDIC SURGERY        Allergies   Allergen Reactions     Ace Inhibitors Cough     Increase with dose increase to 30   Usually with a dose > 20 mg   9:55 AM  2010  Jennie Sow MD      Social History     Tobacco Use     Smoking status: Never Smoker     Smokeless tobacco: Never Used   Substance Use Topics     Alcohol use: Yes     Comment: \"very limited\"      Wt Readings from Last 1 Encounters:   22 74.4 kg (164 lb)        Anesthesia Evaluation   Pt has had prior anesthetic.     No history of anesthetic complications       ROS/MED HX  ENT/Pulmonary:     (+) allergic rhinitis,  (-) tobacco use, asthma, COPD, sleep apnea and MCKAYLA risk factors   Neurologic:  - neg neurologic ROS     Cardiovascular:     (+) Dyslipidemia hypertension-----Previous cardiac testing   Echo: Date:  Results:  ECHOCARDIOGRAM.   Date: 2016 Start: 08:09 AM Facility: [facility]     CONCLUSIONS   1- Left ventricular ejection fraction is visually estimated at 60%.   2- The aortic valve is tricuspid.   There is mild aortic sclerosis without evidence of aortic stenosis.   3- Frequent PVCs noted     Stress Test: Date: Results:    ECG Reviewed: Date: Results:    Cath: Date: Results:   (-) CAD, arrhythmias and stent   METS/Exercise Tolerance:     Hematologic:     (+) History of blood clots, pt is anticoagulated,     Musculoskeletal:       GI/Hepatic:    (-) GERD   Renal/Genitourinary:     (+) renal disease, type: CRI,     Endo:     (+) type II DM, Not using insulin, - not using insulin pump. Diabetic complications: " nephropathy.     Psychiatric/Substance Use:  - neg psychiatric ROS     Infectious Disease:       Malignancy:   (+) Malignancy, History of Prostate.    Other: Comment: Borderline glaucoma           Physical Exam    Airway        Mallampati: III   TM distance: > 3 FB   Neck ROM: full   Mouth opening: > 3 cm    Respiratory Devices and Support         Dental  no notable dental history         Cardiovascular   cardiovascular exam normal       Rhythm and rate: irregular     Pulmonary   pulmonary exam normal                OUTSIDE LABS:  CBC: No results found for: WBC, HGB, HCT, PLT  BMP: No results found for: NA, POTASSIUM, CHLORIDE, CO2, BUN, CR, GLC  COAGS: No results found for: PTT, INR, FIBR  POC: No results found for: BGM, HCG, HCGS  HEPATIC: No results found for: ALBUMIN, PROTTOTAL, ALT, AST, GGT, ALKPHOS, BILITOTAL, BILIDIRECT, MAYUR  OTHER: No results found for: PH, LACT, A1C, RENATO, PHOS, MAG, LIPASE, AMYLASE, TSH, T4, T3, CRP, SED    Anesthesia Plan    ASA Status:  3   NPO Status:  NPO Appropriate    Anesthesia Type: General.     - Airway: ETT   Induction: Intravenous, Propofol.   Maintenance: Balanced.   Techniques and Equipment:     - Lines/Monitors: 2nd IV     Consents    Anesthesia Plan(s) and associated risks, benefits, and realistic alternatives discussed. Questions answered and patient/representative(s) expressed understanding.    - Discussed:     - Discussed with:  Patient      - Extended Intubation/Ventilatory Support Discussed: No.      - Patient is DNR/DNI Status: No    Use of blood products discussed: Yes.     - Discussed with: Patient.     - Consented: consented to blood products            Reason for refusal: other.     Postoperative Care    Pain management: Multi-modal analgesia.   PONV prophylaxis: Ondansetron (or other 5HT-3), Dexamethasone or Solumedrol     Comments:    Other Comments: Patient is counseled on the anesthesia plan and relevant anesthesia procedures including all risks and benefits.  All patient questions were answered.     Multi Model Analgesia:  -Tylenol 1000 mg po.  -Precedex gtt at 0.2-0.5 mcg/kg/hour  -Decadron 8mg IV after induction.   -Dilaudid titrated prn.             Bhupinder Victoria MD

## 2022-08-03 NOTE — PLAN OF CARE
Goal Outcome Evaluation: Arrived from PACU 1300 POD #0 right partial nephrectomy. Pt A/O x4, VSS, CAPNO WDL; performs I.S. appropriately. Oxycodone and scheduled tylenol for pain. Arnold in place with adequate UOP. X4 port sites, CDI w/ liquids glue. RLQ LUCERO to bulb suction. PCDs. Not OOB yet. Tolerating clears-ADAT.        Plan of Care Reviewed With: patient, spouse

## 2022-08-03 NOTE — DISCHARGE INSTRUCTIONS
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    Home-going instructions-----------------         Activity Limitation:     - No driving or operating heavy machinery while on narcotic pain medication.   - No strenuous exercise for 6 weeks.   - No lifting, pushing, pulling more than 10 pounds for 6 weeks. Take care when pushing with your arms to stand up.   - Do not strain your belly area. When you bend, sit up or twice, you could strain the area around your incision.   - Do not strain with bowel movements.   - Do not drive until you can press the brake pedal quickly and fully without pain.   - Do not operate a motor vehicle while taking narcotic pain medications    FOLLOW THESE INSTRUCTIONS AS INDICATED BELOW:  - Observe operative area for signs of excessive bleeding.  - You may shower.  - Increase fluid intake to promote clear urine.  - Resume usual diet as tolerated      What to expect while recovering----------- WOUND CARE:  - You may shower and get incisions wet starting 48 hrs after surgery.  - Do not scrub incisions or submerge wounds (aka, bath, pool, hot tub, etc.) for 2 weeks or until wounds have healed  - Remove wound dressing 48 hours after surgery if already not removed.   - If purple dermabond glue was used, avoid applying any lotions or ointments.   - Leave incision open to air. Cover with gauze only if needed for comfort or to protect clothing from drainage.    Discharge Medications/instructions:   1) PAIN: Oxycodone is a narcotic medication that has been prescribed for pain. Narcotics will cause sleepiness and constipation, therefore it is  "best to stop or reduce them as soon as you can and switch to using acetaminophen (Tylenol) and/or ibuprofen (Advil/Motrin), taken as directed on the packaging. Keep in mind that certain narcotics can contain acetaminophen, also called \"APAP\" on prescription bottles. Do not take more than 4,000mg of Tylenol (acetaminophen/ APAP) from all sources in any 24 hour period since this can cause liver damage. Never drive, operate machinery or drink alcoholic beverages while you are taking narcotic pain medications.     2) CONSTIPATION: Pericolace (senna/docusate sodium) can be taken twice daily for prevention of constipation since surgery, pain medications and bladder spasm medications can all make you constipated. Please reduce or stop pericolace if you develop loose stools. Other over the counter solutions such as prune juice, miralax, fiber products, senna, and dulcolax can also be used. If you are taking the pericolace but still have not had a bowel movement in 3 days, start over-the-counter Milk of Magnesia taken twice daily until you have a nice bowel movement. Call the office with any concerns.     3) Check with your Anticoagulation doctor regarding the need to continue Eliquis. Do not resume this until 8/8/22 if instructed to continue.      Questions/concerns------------------------  [facility]: [phone]  [facility]: [phone]    Future appointments  You are scheduled for follow up on 8/18/22 with Dr. Escobedo.    Khurram Escobedo MD      "

## 2022-08-03 NOTE — ANESTHESIA PROCEDURE NOTES
Airway       Patient location during procedure: OR       Procedure Start/Stop Times: 8/3/2022 7:44 AM  Staff -        Anesthesiologist:  Bhupinder Victoria MD       CRNA: Aggie Spears APRN CRNA       Other Anesthesia Staff: Sweta Massey       Performed By: RYAN and with CRNAs       Procedure performed by resident/fellow/CRNA in presence of a teaching physician.  Indications and Patient Condition       Indications for airway management: alex-procedural       Induction type:intravenous       Mask difficulty assessment: 1 - vent by mask    Final Airway Details       Final airway type: endotracheal airway       Successful airway: ETT - single  Endotracheal Airway Details        ETT size (mm): 8.0       Cuffed: yes       Successful intubation technique: direct laryngoscopy       DL Blade Type: MAC 3       Grade View of Cords: 1       Adjucts: stylet       Position: Left       Measured from: gums/teeth       Secured at (cm): 23       Bite block used: Soft    Post intubation assessment        Placement verified by: capnometry, equal breath sounds and chest rise        Number of attempts at approach: 1       Secured with: silk tape       Ease of procedure: easy       Dentition: Intact and Unchanged    Medication(s) Administered   Medication Administration Time: 8/3/2022 7:44 AM

## 2022-08-04 VITALS
BODY MASS INDEX: 24.29 KG/M2 | OXYGEN SATURATION: 95 % | WEIGHT: 164 LBS | TEMPERATURE: 97.7 F | DIASTOLIC BLOOD PRESSURE: 75 MMHG | HEIGHT: 69 IN | RESPIRATION RATE: 18 BRPM | HEART RATE: 93 BPM | SYSTOLIC BLOOD PRESSURE: 138 MMHG

## 2022-08-04 LAB
ANION GAP SERPL CALCULATED.3IONS-SCNC: 7 MMOL/L (ref 3–14)
BUN SERPL-MCNC: 60 MG/DL (ref 7–30)
CALCIUM SERPL-MCNC: 8.9 MG/DL (ref 8.5–10.1)
CHLORIDE BLD-SCNC: 102 MMOL/L (ref 94–109)
CO2 SERPL-SCNC: 25 MMOL/L (ref 20–32)
CREAT SERPL-MCNC: 3.41 MG/DL (ref 0.66–1.25)
ERYTHROCYTE [DISTWIDTH] IN BLOOD BY AUTOMATED COUNT: 12.5 % (ref 10–15)
GFR SERPL CREATININE-BSD FRML MDRD: 18 ML/MIN/1.73M2
GLUCOSE BLD-MCNC: 140 MG/DL (ref 70–99)
HCT VFR BLD AUTO: 32.6 % (ref 40–53)
HGB BLD-MCNC: 11 G/DL (ref 13.3–17.7)
MCH RBC QN AUTO: 33.4 PG (ref 26.5–33)
MCHC RBC AUTO-ENTMCNC: 33.7 G/DL (ref 31.5–36.5)
MCV RBC AUTO: 99 FL (ref 78–100)
PLATELET # BLD AUTO: 187 10E3/UL (ref 150–450)
POTASSIUM BLD-SCNC: 4.3 MMOL/L (ref 3.4–5.3)
RBC # BLD AUTO: 3.29 10E6/UL (ref 4.4–5.9)
SODIUM SERPL-SCNC: 134 MMOL/L (ref 133–144)
WBC # BLD AUTO: 10.5 10E3/UL (ref 4–11)

## 2022-08-04 PROCEDURE — 85027 COMPLETE CBC AUTOMATED: CPT | Performed by: STUDENT IN AN ORGANIZED HEALTH CARE EDUCATION/TRAINING PROGRAM

## 2022-08-04 PROCEDURE — 96372 THER/PROPH/DIAG INJ SC/IM: CPT | Performed by: STUDENT IN AN ORGANIZED HEALTH CARE EDUCATION/TRAINING PROGRAM

## 2022-08-04 PROCEDURE — 250N000013 HC RX MED GY IP 250 OP 250 PS 637: Performed by: STUDENT IN AN ORGANIZED HEALTH CARE EDUCATION/TRAINING PROGRAM

## 2022-08-04 PROCEDURE — 36415 COLL VENOUS BLD VENIPUNCTURE: CPT | Performed by: STUDENT IN AN ORGANIZED HEALTH CARE EDUCATION/TRAINING PROGRAM

## 2022-08-04 PROCEDURE — 80048 BASIC METABOLIC PNL TOTAL CA: CPT | Performed by: STUDENT IN AN ORGANIZED HEALTH CARE EDUCATION/TRAINING PROGRAM

## 2022-08-04 PROCEDURE — 250N000011 HC RX IP 250 OP 636: Performed by: STUDENT IN AN ORGANIZED HEALTH CARE EDUCATION/TRAINING PROGRAM

## 2022-08-04 RX ADMIN — FLUTICASONE PROPIONATE 2 SPRAY: 50 SPRAY, METERED NASAL at 09:07

## 2022-08-04 RX ADMIN — HEPARIN SODIUM 5000 UNITS: 5000 INJECTION, SOLUTION INTRAVENOUS; SUBCUTANEOUS at 08:50

## 2022-08-04 RX ADMIN — Medication 50 MCG: at 08:51

## 2022-08-04 RX ADMIN — Medication 500 MG: at 08:51

## 2022-08-04 RX ADMIN — ACETAMINOPHEN 650 MG: 325 TABLET ORAL at 12:38

## 2022-08-04 RX ADMIN — SENNOSIDES AND DOCUSATE SODIUM 1 TABLET: 50; 8.6 TABLET ORAL at 08:51

## 2022-08-04 RX ADMIN — ACETAMINOPHEN 650 MG: 325 TABLET ORAL at 06:39

## 2022-08-04 RX ADMIN — METOPROLOL SUCCINATE 100 MG: 100 TABLET, EXTENDED RELEASE ORAL at 08:51

## 2022-08-04 RX ADMIN — POLYETHYLENE GLYCOL 3350 17 G: 17 POWDER, FOR SOLUTION ORAL at 08:50

## 2022-08-04 RX ADMIN — ACETAMINOPHEN 650 MG: 325 TABLET ORAL at 00:56

## 2022-08-04 RX ADMIN — CETIRIZINE HYDROCHLORIDE 10 MG: 10 TABLET, FILM COATED ORAL at 08:51

## 2022-08-04 NOTE — PROGRESS NOTES
"Urology  Progress Note  08/04/2022    NAEO  AF, VSS, mild HTN noted this AM (145/88)  Pain well controlled  Tolerating FLD, no N/V  Ambulating    Exam  BP (!) 144/88 (BP Location: Right arm)   Pulse 95   Temp 97.7  F (36.5  C) (Oral)   Resp 18   Ht 1.753 m (5' 9\")   Wt 74.4 kg (164 lb)   SpO2 95%   BMI 24.22 kg/m    No acute distress  Unlabored breathing  Abdomen soft, appropriately tender, nondistended. Incisions c/d/i  LUCERO serosanguinous  Bronson with clear yellow urine in tubing    I/O's (last 24/since midnight)  UOP 1650/225  LUCERO 155/40    Labs  AM labs pending    Assessment/Plan  76 year old male POD#1 s/p robotic right partial nephrectomy for right renal tumor.     Neuro: scheduled tylenol, prn IV dilaudid, prn oxy for pain control  CV: HDS. PTA metoprolol  & statin, PRN antiHTN. Holding PTA losartan.  Pulm: Supplemental O2, wean as able; incentive spirometry while awake  FEN/GI: Regular diet, stop mIVF, bowl regimen  Endo: LISA  : Remove bronson this morning for TOV. Monitor UOP. LUCERO drain removal prior to discharge pending outputs.  ID:  Miladis-op abx complete. Monitor for leukocytosis and fevers.  Heme: Hgb pending. Holding PTA Eliquis. Likely will restart on discharge.  Activity: Up ad rodrigue   PPx: SCDs, SQH  Dispo: Floor, likely discharge home today    Seen and examined. Will discuss with staff, Dr. Escobedo.    Carlos Peace MD  Urology Resident, PGY-5  (p) [phone]            "

## 2022-08-04 NOTE — PLAN OF CARE
Goal Outcome Evaluation:    Plan of Care Reviewed With: patient     POD1 R partial nephrectomy. A&Ox4, VSS on RA, up SBA, regular diet. Pain managed w/ prn oxy x1 & scheduled Tylenol. BS hypoactive, not passing gas. Abd lap sites w/ liquid bandage, CDI. RLQ LUCERO w/ minimal drainage at site, bloody/bright red output. Arnold in place w/ clear yellow UOP.  Will continue plan of care.

## 2022-08-04 NOTE — PLAN OF CARE
Goal Outcome Evaluation:      VSS on RA. Tolerating normal diet. Minimum pain- controlled. Arnold removed. LUCERO drain removed. IV's removed. Went over discharge paperwork and medications. Pt discharged w/ wife.

## 2022-08-15 LAB
PATH REPORT.COMMENTS IMP SPEC: ABNORMAL
PATH REPORT.COMMENTS IMP SPEC: YES
PATH REPORT.FINAL DX SPEC: ABNORMAL
PATH REPORT.GROSS SPEC: ABNORMAL
PATH REPORT.MICROSCOPIC SPEC OTHER STN: ABNORMAL
PATH REPORT.MICROSCOPIC SPEC OTHER STN: ABNORMAL
PATH REPORT.RELEVANT HX SPEC: ABNORMAL
PATHOLOGY SYNOPTIC REPORT: ABNORMAL
PHOTO IMAGE: ABNORMAL

## 2022-08-15 PROCEDURE — 88342 IMHCHEM/IMCYTCHM 1ST ANTB: CPT | Mod: 26 | Performed by: PATHOLOGY

## 2022-08-15 PROCEDURE — 88341 IMHCHEM/IMCYTCHM EA ADD ANTB: CPT | Mod: 26 | Performed by: PATHOLOGY

## 2022-08-15 PROCEDURE — 88307 TISSUE EXAM BY PATHOLOGIST: CPT | Mod: 26 | Performed by: PATHOLOGY

## 2022-08-18 ENCOUNTER — OFFICE VISIT (OUTPATIENT)
Dept: UROLOGY | Facility: CLINIC | Age: 77
End: 2022-08-18
Payer: MEDICARE

## 2022-08-18 DIAGNOSIS — N18.31 STAGE 3A CHRONIC KIDNEY DISEASE (H): ICD-10-CM

## 2022-08-18 DIAGNOSIS — C64.1 MALIGNANT NEOPLASM OF RIGHT KIDNEY, EXCEPT RENAL PELVIS (H): Primary | ICD-10-CM

## 2022-08-18 DIAGNOSIS — Z90.5 H/O PARTIAL NEPHRECTOMY: ICD-10-CM

## 2022-08-18 PROCEDURE — 99024 POSTOP FOLLOW-UP VISIT: CPT | Performed by: UROLOGY

## 2022-08-18 ASSESSMENT — PAIN SCALES - GENERAL: PAINLEVEL: MILD PAIN (2)

## 2022-08-18 NOTE — NURSING NOTE
Ventura Newby's chief complaint for this visit includes:  Chief Complaint   Patient presents with     RECHECK     Post op- DOS 8/3     PCP: Abisai Medeiros    Referring Provider:  No referring provider defined for this encounter.    There were no vitals taken for this visit.  Mild Pain (2)        Allergies   Allergen Reactions     Ace Inhibitors Cough     Increase with dose increase to 30   Usually with a dose > 20 mg   9:55 AM  12/20/2010  Jennie Sow MD         Do you need any medication refills at today's visit?  Patricia Trotter  In-Clinic Visit Facilitator   [facility]

## 2022-08-18 NOTE — LETTER
8/18/2022       RE: Ventura Newby  [address]  [place] [address]     Dear Colleague,    Thank you for referring your patient, Ventura Newby, to the [facility]. Please see a copy of my visit note below.    [place]   CHIEF COMPLAINT   It was my pleasure to see Ventura Newby who is a 77 year old male for follow-up of right renal cell carcinoma.      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 Blunt at [facility] in 2005  Medical history with CKD stage III/IV     Recently found to have a  RIGHT DVT and now on Xarelto since 4/1/2022  He had been driving and sitting a lot for the past few weeks  He is following up with hematology clinic on 4/11/2022      TODAY 8/18/2022:  ##Kidney Cancer Follow up##  Patient is status post Robotic Partial Nephrectomy on 8/3/2022.   Tumor was on the right side.   Maximal tumor dimension was 3.1 cm.    The histologic subtype was Clear Cell.    ISUP Grade 1.    Pathologic Stage T1a.    Tumor thrombus level  not applicable.    Tumor necrosis present: No.  Sarcomatoid features present: No.  Lymph Nodes Removed No.   Number of nodes removed n/a, number of node positive for cancer n/a.   Was the ipsilateral adrenal gland removed? No.  Neoadjuvant Chemotherapy? No.  Was Margin Positive? No.    There were not deviations from a normal post-operative course or complications?.    The patient was not  readmitted to the hospital since post-operative discharge.    He did well after surgery with no issues      PHYSICAL EXAM  Patient is a 77 year old  male   Vitals: There were no vitals taken for this visit.  There is no height or weight on file to calculate BMI.  General Appearance Adult:   Alert, no acute distress, oriented  HENT: throat/mouth:normal, good dentition  Lungs: no respiratory distress, or pursed lip breathing  Heart: No obvious jugular venous distension present  Abdomen: soft, nontender, no organomegaly or masses  Incisions: clean, dry and intact with peeling skin glue in place, no evidence of hernia or infection  Musculoskeltal: extremities normal, no peripheral edema  Skin: no suspicious lesions or rashes  Neuro: Alert, oriented, speech and mentation normal  Psych: affect and mood normal  Gait: Normal    Creatinine   Date Value Ref Range Status   08/04/2022 3.41 (H) 0.66 - 1.25 mg/dL Final   08/03/2022 3.77 (H) 0.66 - 1.25 mg/dL Final     OUTSIDE LABS:  Cr:  4/1/22 = 3.03  2/2/22 = 3.19  11/29/21 = 2.72     PSA = <0.1    PATHOLOGY:  Final Diagnosis   Right kidney, right partial nephrectomy -   -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) ISUP grade 1/4(please see comment)  -Please see detailed tumor synopsis below        ASSESSMENT and PLAN  77-year-old man now status post a robotic partial nephrectomy on 8/3/2022 with a 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    Khurram Escobedo MD   Urology  [facility]  [facility] Phone: [phone]  [facility] Phone: [phone]          Again, thank you for allowing me to participate in the care of your patient.      Sincerely,    Khurram Escobedo MD

## 2022-08-18 NOTE — PROGRESS NOTES
[facility]   CHIEF COMPLAINT   It was my pleasure to see Ventura Newby who is a 77 year old male for follow-up of right renal cell carcinoma.      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 Blunt at [facility] in 2005  Medical history with CKD stage III/IV     Recently found to have a  RIGHT DVT and now on Xarelto since 4/1/2022  He had been driving and sitting a lot for the past few weeks  He is following up with hematology clinic on 4/11/2022      TODAY 8/18/2022:  ##Kidney Cancer Follow up##  Patient is status post Robotic Partial Nephrectomy on 8/3/2022.   Tumor was on the right side.   Maximal tumor dimension was 3.1 cm.    The histologic subtype was Clear Cell.    ISUP Grade 1.    Pathologic Stage T1a.    Tumor thrombus level  not applicable.    Tumor necrosis present: No.  Sarcomatoid features present: No.  Lymph Nodes Removed No.   Number of nodes removed n/a, number of node positive for cancer n/a.   Was the ipsilateral adrenal gland removed? No.  Neoadjuvant Chemotherapy? No.  Was Margin Positive? No.    There were not deviations from a normal post-operative course or complications?.    The patient was not readmitted to the hospital since post-operative discharge.    He did well after surgery with no issues      PHYSICAL EXAM  Patient is a 77 year old  male   Vitals: There were no vitals taken for this visit.  There is no height or weight on file to calculate BMI.  General Appearance Adult:   Alert,  no acute distress, oriented  HENT: throat/mouth:normal, good dentition  Lungs: no respiratory distress, or pursed lip breathing  Heart: No obvious jugular venous distension present  Abdomen: soft, nontender, no organomegaly or masses  Incisions: clean, dry and intact with peeling skin glue in place, no evidence of hernia or infection  Musculoskeltal: extremities normal, no peripheral edema  Skin: no suspicious lesions or rashes  Neuro: Alert, oriented, speech and mentation normal  Psych: affect and mood normal  Gait: Normal    Creatinine   Date Value Ref Range Status   08/04/2022 3.41 (H) 0.66 - 1.25 mg/dL Final   08/03/2022 3.77 (H) 0.66 - 1.25 mg/dL Final     OUTSIDE LABS:  Cr:  4/1/22 = 3.03  2/2/22 = 3.19  11/29/21 = 2.72     PSA = <0.1    PATHOLOGY:  Final Diagnosis   Right kidney, right partial nephrectomy -   -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) ISUP grade 1/4(please see comment)  -Please see detailed tumor synopsis below        ASSESSMENT and PLAN  77-year-old man now status post a robotic partial nephrectomy on 8/3/2022 with a 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    Khurram Escobedo MD   Urology  [facility]  [facility] Phone: [phone]  [facility] Phone: [phone]

## 2022-10-03 ENCOUNTER — HEALTH MAINTENANCE LETTER (OUTPATIENT)
Age: 77
End: 2022-10-03

## 2022-12-29 ENCOUNTER — TELEPHONE (OUTPATIENT)
Dept: UROLOGY | Facility: CLINIC | Age: 77
End: 2022-12-29

## 2022-12-29 NOTE — TELEPHONE ENCOUNTER
[facility] Call Center    Phone Message    May a detailed message be left on voicemail: yes     Reason for Call: Other: .  Pt calling regarding upcoming VV with [facility] in Feb, pt is wanting to resched to a later time/date. Writer unable to schedule, no times or dates populating. Please call pt      Action Taken: Message routed to:  Other: Uro    Travel Screening: Not Applicable

## 2022-12-29 NOTE — TELEPHONE ENCOUNTER
Left patient a phone call to call back about rescheduling. Left scheduling number [phone].    Vida Starr LPN on 12/29/2022 at 2:55 PM

## 2023-03-01 ENCOUNTER — ANCILLARY PROCEDURE (OUTPATIENT)
Dept: CT IMAGING | Facility: CLINIC | Age: 78
End: 2023-03-01
Attending: UROLOGY
Payer: MEDICARE

## 2023-03-01 DIAGNOSIS — C64.1 MALIGNANT NEOPLASM OF RIGHT KIDNEY, EXCEPT RENAL PELVIS (H): ICD-10-CM

## 2023-03-01 DIAGNOSIS — Z90.5 H/O PARTIAL NEPHRECTOMY: ICD-10-CM

## 2023-03-01 PROCEDURE — 71250 CT THORAX DX C-: CPT | Mod: MG | Performed by: RADIOLOGY

## 2023-03-01 PROCEDURE — G1010 CDSM STANSON: HCPCS | Performed by: RADIOLOGY

## 2023-03-01 PROCEDURE — 74176 CT ABD & PELVIS W/O CONTRAST: CPT | Mod: MG | Performed by: RADIOLOGY

## 2023-03-03 ENCOUNTER — TRANSFERRED RECORDS (OUTPATIENT)
Dept: HEALTH INFORMATION MANAGEMENT | Facility: CLINIC | Age: 78
End: 2023-03-03

## 2023-03-14 ENCOUNTER — VIRTUAL VISIT (OUTPATIENT)
Dept: UROLOGY | Facility: CLINIC | Age: 78
End: 2023-03-14
Payer: MEDICARE

## 2023-03-14 DIAGNOSIS — Z99.2 ESRD (END STAGE RENAL DISEASE) ON DIALYSIS (H): ICD-10-CM

## 2023-03-14 DIAGNOSIS — N18.6 ESRD (END STAGE RENAL DISEASE) ON DIALYSIS (H): ICD-10-CM

## 2023-03-14 DIAGNOSIS — C64.1 MALIGNANT NEOPLASM OF RIGHT KIDNEY, EXCEPT RENAL PELVIS (H): Primary | ICD-10-CM

## 2023-03-14 PROCEDURE — 99214 OFFICE O/P EST MOD 30 MIN: CPT | Mod: VID | Performed by: UROLOGY

## 2023-03-14 NOTE — NURSING NOTE
Is the patient currently in the state of [place]? YES    Visit mode:VIDEO    If the visit is dropped, the patient can be reconnected by: VIDEO VISIT: Text to cell phone: [phone]    Will anyone else be joining the visit? NO      How would you like to obtain your AVS? MyChart    Are changes needed to the allergy or medication list? YES: Pt has many meds flagged for removal      Irbesartan- no longer on this medication     Multivitamin has changed- pt is taking Dialyvit for pt's on dialysis    Reason for visit:  Go over CT results from 2/20/23        Lu Courtney on 3/14/2023 at 1:15 PM

## 2023-03-14 NOTE — PROGRESS NOTES
[facility]   CHIEF COMPLAINT   It was my pleasure to see Ventura Newby who is a 77 year old male for follow-up of right renal cell carcinoma.      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 Blunt at [facility] in 2005  Medical history with CKD stage III/IV     Recently found to have a  RIGHT DVT and now on Xarelto since 4/1/2022  He had been driving and sitting a lot for the past few weeks  He is following up with hematology clinic on 4/11/2022 8/18/2022:  ##Kidney Cancer Follow up##  Patient is status post Robotic Partial Nephrectomy on 8/3/2022.   Tumor was on the right side.   Maximal tumor dimension was 3.1 cm.    The histologic subtype was Clear Cell.    ISUP Grade 1.    Pathologic Stage T1a.    Tumor thrombus level  not applicable.    Tumor necrosis present: No.  Sarcomatoid features present: No.  Lymph Nodes Removed No.   Number of nodes removed n/a, number of node positive for cancer n/a.   Was the ipsilateral adrenal gland removed? No.  Neoadjuvant Chemotherapy? No.  Was Margin Positive? No.    There were not deviations from a normal post-operative course or complications?.    The patient was not readmitted to the hospital since post-operative discharge.    He did well after surgery with no issues    TODAY 3/14/2023:  Hospitalized in October for kidney failure  Started on Dialysis at the end of Nov Getting HD  Baseline GFR was 16    PHYSICAL EXAM  Patient is a 77 year old  male   Vitals: There  were no vitals taken for this visit.  There is no height or weight on file to calculate BMI.  General Appearance Adult:   Alert, no acute distress, oriented  HENT: throat/mouth:normal, good dentition  Lungs: no respiratory distress, or pursed lip breathing  Heart: No obvious jugular venous distension present  Abdomen: non - distended  Musculoskeltal: extremities normal, no peripheral edema  Skin: no suspicious lesions or rashes  Neuro: Alert, oriented, speech and mentation normal  Psych: affect and mood normal         PSA = <0.1    PATHOLOGY:  Final Diagnosis   Right kidney, right partial nephrectomy -   -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) ISUP grade 1/4(please see comment)  -Please see detailed tumor synopsis below      CT CHEST/ABD/PEL 3/1/2023:  FINDINGS:   LUNGS AND PLEURA: 1 cm subpleural nodular opacity right upper lobe with adjacent linear parenchymal band of scar previously measured 2.5 x 3.3 cm on 10/04/2022, suggesting round atelectasis. New pulmonary nodules measuring 3 mm image 58, and 2 mm image   103 series 8. Additional pulmonary nodules and perifissural nodules are stable, largest in the right lower lobe measuring 5 mm images 141 and 147 series 8. Mild upper lung predominant emphysema. Lingular subsegmental atelectasis. Central airways are   patent.     MEDIASTINUM/AXILLAE: Right IJ tunnel dialysis catheter. No mass/adenopathy. Physiologic pericardial effusion. Panchamber cardiac enlargement. The thoracic aorta is normal in size.     CORONARY ARTERY CALCIFICATION: Mild.     HEPATOBILIARY: Normal.     PANCREAS: Normal.     SPLEEN: Normal.     ADRENAL GLANDS: Normal.     KIDNEYS/BLADDER: Stable postop changes from right partial nephrectomy. 2 mm nonobstructing calculi x1 right lower pole and x1 left lower pole. No ureteral calculus or hydronephrosis. The bladder is nondistended.     BOWEL: Diverticulosis of the colon. No acute inflammatory change. No obstruction. Normal  appendix.     LYMPH NODES: No lymphadenopathy.     VASCULATURE: No aortoiliac aneurysm.     PELVIC ORGANS: Prostatectomy.     MUSCULOSKELETAL: S-shaped curvature of thoracolumbar spine with degenerative changes in the spine and hips. No suspicious osseous lesions.                                                                      IMPRESSION:  1.  Stable postop changes from right partial nephrectomy. No evidence of local recurrence.  2.  New pulmonary nodules x2 in the left upper lobe measuring 2-3 mm are indeterminate and could be followed up with CT. Additional bilateral pulmonary nodules measuring 5 mm or less are otherwise stable.  3.  1 cm subpleural round atelectasis right upper lobe has decreased compared with 10/04/2022.  4.  Bilateral nonobstructing nephrolithiasis.  5.  Prostatectomy.    ASSESSMENT and PLAN  77-year-old man now status post a robotic partial nephrectomy on 8/3/2022 with a 3.1 cm clear-cell papillary renal cell carcinoma, pT1a, negative margins, with baseline CKD stage III/IV    Clear-cell renal cell carcinoma  - Reviewed his pathology from surgery  - we discussed his surveillance CT which was overall non-concerning  - Plan for follow-up in 12 months with surveillance imaging    ESRD  - now on HD    Time spent: 15 minutes spent on the date of the encounter doing chart review, history and exam, documentation and further activities as noted above.    Khurram Escobedo MD   Urology  [facility]  [facility] Phone: [phone]  [facility] Phone: [phone]        Video-Visit Details    Type of service:  Video Visit    Video Start Time (time video started): 1:30 PM    Video End Time (time video stopped): 1:40 PM    Originating Location (pt. Location): Home    Distant Location (provider location):  On-site    Mode of Communication:  Video Conference via iLogon

## 2023-06-04 ENCOUNTER — HEALTH MAINTENANCE LETTER (OUTPATIENT)
Age: 78
End: 2023-06-04

## 2024-03-13 ENCOUNTER — ANCILLARY PROCEDURE (OUTPATIENT)
Dept: CT IMAGING | Facility: CLINIC | Age: 79
End: 2024-03-13
Attending: UROLOGY
Payer: MEDICARE

## 2024-03-13 DIAGNOSIS — Z99.2 ESRD (END STAGE RENAL DISEASE) ON DIALYSIS (H): ICD-10-CM

## 2024-03-13 DIAGNOSIS — N18.6 ESRD (END STAGE RENAL DISEASE) ON DIALYSIS (H): ICD-10-CM

## 2024-03-13 DIAGNOSIS — C64.1 MALIGNANT NEOPLASM OF RIGHT KIDNEY, EXCEPT RENAL PELVIS (H): ICD-10-CM

## 2024-03-13 LAB
CREAT BLD-MCNC: 1.1 MG/DL (ref 0.7–1.3)
EGFRCR SERPLBLD CKD-EPI 2021: >60 ML/MIN/1.73M2

## 2024-03-13 PROCEDURE — 82565 ASSAY OF CREATININE: CPT

## 2024-03-13 PROCEDURE — G1010 CDSM STANSON: HCPCS | Mod: GC | Performed by: STUDENT IN AN ORGANIZED HEALTH CARE EDUCATION/TRAINING PROGRAM

## 2024-03-13 PROCEDURE — 74177 CT ABD & PELVIS W/CONTRAST: CPT | Mod: MG | Performed by: STUDENT IN AN ORGANIZED HEALTH CARE EDUCATION/TRAINING PROGRAM

## 2024-03-13 PROCEDURE — 71260 CT THORAX DX C+: CPT | Mod: MG | Performed by: STUDENT IN AN ORGANIZED HEALTH CARE EDUCATION/TRAINING PROGRAM

## 2024-03-13 RX ORDER — IOPAMIDOL 755 MG/ML
90 INJECTION, SOLUTION INTRAVASCULAR ONCE
Status: COMPLETED | OUTPATIENT
Start: 2024-03-13 | End: 2024-03-13

## 2024-03-13 RX ADMIN — IOPAMIDOL 90 ML: 755 INJECTION, SOLUTION INTRAVASCULAR at 08:43

## 2024-03-14 ENCOUNTER — TELEPHONE (OUTPATIENT)
Dept: UROLOGY | Facility: CLINIC | Age: 79
End: 2024-03-14
Payer: MEDICARE

## 2024-03-14 DIAGNOSIS — Q45.3 PANCREATIC ABNORMALITY: Primary | ICD-10-CM

## 2024-03-14 LAB — RADIOLOGIST FLAGS: NORMAL

## 2024-03-14 NOTE — TELEPHONE ENCOUNTER
[facility] Call Center    Phone Message    May a detailed message be left on voicemail: no     Reason for Call: Other: Jenn Called to report an incidental finding on this patient. Please call back to discuss at [phone].     Action Taken: Other: [facility]    Travel Screening: Not Applicable

## 2024-03-14 NOTE — TELEPHONE ENCOUNTER
Called and spoke to Jenn with [facility] who reports that the radiologist flagged an incidental finding of focal pancreatic observations on the 3/13/24 CT chest/abdomen/pelvis located at 2A, 2B, and 3.    Message sent to Dr. Escobedo with request to review and advise.    Feli Wang RN, BSN

## 2024-03-15 NOTE — TELEPHONE ENCOUNTER
Called and spoke to patient. Video visit with Dr. Escobedo rescheduled to 4/2/24 at 2:00pm.     Feli Wang RN, BSN

## 2024-03-15 NOTE — TELEPHONE ENCOUNTER
Called and spoke to patient who is aware of the 3/13/24 CT imaging findings of focal pancreatic observations and recommendations from Dr. Escobedo to proceed with pancreas MRI. Patient aware that Dr. Escobedo will plan to discuss the CT and MRI in detail at the 3/28/24 visit. Patient verbalized understanding and was comfortable with plan.    MRI of the pancreas ordered per Dr. Escobedo. Patient was transferred to imaging department to schedule MRI.     Feli Wang RN, BSN

## 2024-03-15 NOTE — TELEPHONE ENCOUNTER
Khurram Escobedo MD  You; [facility] Adult [place]16 hours ago (4:59 PM)     BH  Please order pancreas MRI with and without contrast.  Thanks,  Khurram Escobedo M.D.     Received the above message from Dr. Escobedo who reviewed the 3/13/24 CT chest/abdomen/pelvis results. Attempted to reach patient by phone, but no answer. Left generic message with request to return call to clinic. When patient returns call, will discuss the 3/13/24 CT results, the above recommendations from Dr. Escobedo, MRI questions, and then transfer to imaging to schedule pancreas MRI. Patient is scheduled to see Dr. Ecsobedo on 3/28/24 and these CT results and MRI results could be discussed in detail at that visit.    Feli Wang RN, BSN

## 2024-03-15 NOTE — TELEPHONE ENCOUNTER
[facility] Call Center    Phone Message    May a detailed message be left on voicemail: yes     Reason for Call: Requesting Results     Name/type of test: CT  Date of test: 03/13/2024    Patient called for results, please call patient back.  Action Taken: Other: [facility] -Urology    Travel Screening: Not Applicable

## 2024-03-29 ENCOUNTER — ANCILLARY PROCEDURE (OUTPATIENT)
Dept: MRI IMAGING | Facility: CLINIC | Age: 79
End: 2024-03-29
Attending: UROLOGY
Payer: MEDICARE

## 2024-03-29 DIAGNOSIS — Q45.3 PANCREATIC ABNORMALITY: ICD-10-CM

## 2024-03-29 PROCEDURE — 74183 MRI ABD W/O CNTR FLWD CNTR: CPT | Mod: MG | Performed by: RADIOLOGY

## 2024-03-29 PROCEDURE — A9585 GADOBUTROL INJECTION: HCPCS | Performed by: RADIOLOGY

## 2024-03-29 PROCEDURE — G1010 CDSM STANSON: HCPCS | Performed by: RADIOLOGY

## 2024-03-29 RX ORDER — GADOBUTROL 604.72 MG/ML
7.5 INJECTION INTRAVENOUS ONCE
Status: COMPLETED | OUTPATIENT
Start: 2024-03-29 | End: 2024-03-29

## 2024-03-29 RX ADMIN — GADOBUTROL 7.5 ML: 604.72 INJECTION INTRAVENOUS at 08:28

## 2024-04-02 ENCOUNTER — VIRTUAL VISIT (OUTPATIENT)
Dept: UROLOGY | Facility: CLINIC | Age: 79
End: 2024-04-02
Payer: MEDICARE

## 2024-04-02 DIAGNOSIS — C64.1 MALIGNANT NEOPLASM OF RIGHT KIDNEY, EXCEPT RENAL PELVIS (H): Primary | ICD-10-CM

## 2024-04-02 DIAGNOSIS — Z90.5 H/O PARTIAL NEPHRECTOMY: ICD-10-CM

## 2024-04-02 DIAGNOSIS — N18.6 ESRD (END STAGE RENAL DISEASE) ON DIALYSIS (H): ICD-10-CM

## 2024-04-02 DIAGNOSIS — Z99.2 ESRD (END STAGE RENAL DISEASE) ON DIALYSIS (H): ICD-10-CM

## 2024-04-02 DIAGNOSIS — D49.0 IPMN (INTRADUCTAL PAPILLARY MUCINOUS NEOPLASM): ICD-10-CM

## 2024-04-02 PROCEDURE — 99214 OFFICE O/P EST MOD 30 MIN: CPT | Mod: 95 | Performed by: UROLOGY

## 2024-04-02 NOTE — PROGRESS NOTES
[facility]   CHIEF COMPLAINT   It was my pleasure to see Ventura Newby who is a 78 year old male for follow-up of right renal cell carcinoma.      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 Blunt at [facility] in 2005  Medical history with CKD stage III/IV     Recently found to have a  RIGHT DVT and now on Xarelto since 4/1/2022  He had been driving and sitting a lot for the past few weeks  He is following up with hematology clinic on 4/11/2022 8/18/2022:  ##Kidney Cancer Follow up##  Patient is status post Robotic Partial Nephrectomy on 8/3/2022.   Tumor was on the right side.   Maximal tumor dimension was 3.1 cm.    The histologic subtype was Clear Cell.    ISUP Grade 1.    Pathologic Stage T1a.    Tumor thrombus level  not applicable.    Tumor necrosis present: No.  Sarcomatoid features present: No.  Lymph Nodes Removed No.   Number of nodes removed n/a, number of node positive for cancer n/a.   Was the ipsilateral adrenal gland removed? No.  Neoadjuvant Chemotherapy? No.  Was Margin Positive? No.    There were not deviations from a normal post-operative course or complications?.    The patient was not readmitted to the hospital since post-operative discharge.    He did well after surgery with no issues    3/14/2023:  Hospitalized in October for kidney failure  Started on Dialysis at the end of Nov  Getting HD  Baseline GFR was 16    TODAY 4/2/2024:  He continues on HD and doing well on TThS schedule    Follow-up to review his surveillance imaging  No other issues or concerns today    PHYSICAL EXAM  Patient is a 78 year old  male   Vitals: There were no vitals taken for this visit.  There is no height or weight on file to calculate BMI.  General Appearance Adult:   Alert, no acute distress, oriented  Neuro: Alert, oriented, speech and mentation normal  Psych: affect and mood normal       Creatinine   Date Value Ref Range Status   08/18/2022 2.50 (H) 0.66 - 1.25 mg/dL Final     Creatinine POCT   Date Value Ref Range Status   03/13/2024 1.1 0.7 - 1.3 mg/dL Final         PSA = <0.1    PATHOLOGY:  Final Diagnosis   Right kidney, right partial nephrectomy -   -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) ISUP grade 1/4(please see comment)  -Please see detailed tumor synopsis below      CT CHEST/ABD/PEL 3/1/2023:  FINDINGS:   LUNGS AND PLEURA: 1 cm subpleural nodular opacity right upper lobe with adjacent linear parenchymal band of scar previously measured 2.5 x 3.3 cm on 10/04/2022, suggesting round atelectasis. New pulmonary nodules measuring 3 mm image 58, and 2 mm image   103 series 8. Additional pulmonary nodules and perifissural nodules are stable, largest in the right lower lobe measuring 5 mm images 141 and 147 series 8. Mild upper lung predominant emphysema. Lingular subsegmental atelectasis. Central airways are   patent.     MEDIASTINUM/AXILLAE: Right IJ tunnel dialysis catheter. No mass/adenopathy. Physiologic pericardial effusion. Panchamber cardiac enlargement. The thoracic aorta is normal in size.     CORONARY ARTERY CALCIFICATION: Mild.     HEPATOBILIARY: Normal.     PANCREAS: Normal.     SPLEEN: Normal.     ADRENAL GLANDS: Normal.     KIDNEYS/BLADDER: Stable postop changes from right partial nephrectomy. 2 mm nonobstructing calculi x1 right lower pole and x1 left lower pole. No ureteral calculus or hydronephrosis. The bladder is nondistended.     BOWEL: Diverticulosis of the colon. No acute  inflammatory change. No obstruction. Normal appendix.     LYMPH NODES: No lymphadenopathy.     VASCULATURE: No aortoiliac aneurysm.     PELVIC ORGANS: Prostatectomy.     MUSCULOSKELETAL: S-shaped curvature of thoracolumbar spine with degenerative changes in the spine and hips. No suspicious osseous lesions.                                                                      IMPRESSION:  1.  Stable postop changes from right partial nephrectomy. No evidence of local recurrence.  2.  New pulmonary nodules x2 in the left upper lobe measuring 2-3 mm are indeterminate and could be followed up with CT. Additional bilateral pulmonary nodules measuring 5 mm or less are otherwise stable.  3.  1 cm subpleural round atelectasis right upper lobe has decreased compared with 10/04/2022.  4.  Bilateral nonobstructing nephrolithiasis.  5.  Prostatectomy.    CT CHEST/ABD/PEL 3/13/2024:  IMPRESSION:   1. Postsurgical changes of partial right nephrectomy. No recurrent  mass. Few too small to characterize renal cortical hypodensities,  consider attention on follow-up.  2a. Subcentimeter enhancing lesion in the pancreatic neck,  indeterminate, neoplastic etiology not excluded. Consider contrast  enhanced MRI for further evaluation.  2b. Additional cystlike lesion in the pancreatic head/uncinate process  may represent IPMN. This may also be evaluated at the time of MRI.  3. Few too small to represent hepatic hypodensities more conspicuous  compared to on prior noncontrast CT scans, consider attention on  follow-up, alternatively they can also be evaluated at the time of  MRI.  4. Similar pulmonary nodules and stable to  slight decrease in right  upper lobe peripheral density. No new pulmonary nodule or mass.  Consider attention on follow-up.    MRI PANCREAS 3/29/2024:  IMPRESSION:   1. No appreciable suspicious enhancing lesion in the pancreatic neck  corresponding to the finding of concern on comparison CT.  2. Scattered pancreatic  cystic foci measuring up to 1.6 cm in the  head/neck, likely side branch intraductal papillary mucinous  neoplasms. No suspicious features. Recommend follow-up MRI in 6  months, then every 18 months, if stable, per updated [place] guidelines.  3. Pancreas divisum.  4. Stable partial right nephrectomy changes without evidence of local  recurrence. No new suspicious renal lesion.  5. Scattered benign hepatic cysts.  6. Hepatic and splenic iron deposition.    ASSESSMENT and PLAN  78-year-old man now status post a robotic partial nephrectomy on 8/3/2022 with a 3.1 cm clear-cell papillary renal cell carcinoma, pT1a, negative margins, with baseline CKD stage III/IV now on HD.  Recent imaging with an incidental pancreatic cyst    Clear-cell renal cell carcinoma  - Reviewed his pathology from surgery  -I reviewed his updated CT scan as well as his MRI with no concern for recurrence  - Will plan for repeat MRI pancreas protocol in 6 months and then in 2 years which will also demonstrate and visualize the kidneys  - Follow-up in 2 years    ESRD  -He is doing well on hemodialysis    Pancreatic cyst  - The area of concern on the CT scan was nonconcerning on the MRI, however he does have a 1.6 cm right IPMN  - Repeat imaging in 6 months and I will send him the results on TxtFeedback    Time spent: 12 minutes spent on the date of the encounter doing chart review, history and exam, documentation and further activities as noted above.    Khurram Escobedo MD   Urology  [facility]  [facility] Phone: [phone]  [facility] Phone: [phone]            Virtual Visit Details    Type of service:  Video Visit   Video Start Time: 2:05 PM  Video End Time:2:12 PM    Originating Location (pt. Location): Home    Distant Location (provider location):  On-site  Platform used for Video Visit: Elda

## 2024-04-02 NOTE — NURSING NOTE
Is the patient currently in the state of [place]? YES    Visit mode:VIDEO    If the visit is dropped, the patient can be reconnected by: VIDEO VISIT: Send to e-mail at: [email or web address]    Will anyone else be joining the visit? NO  (If patient encounters technical issues they should call [phone])    How would you like to obtain your AVS? MyChart    Are changes needed to the allergy or medication list? No    Reason for visit: Follow Up    Cari STACK

## 2024-05-15 ENCOUNTER — ANCILLARY PROCEDURE (OUTPATIENT)
Dept: PET IMAGING | Facility: CLINIC | Age: 79
End: 2024-05-15
Attending: INTERNAL MEDICINE
Payer: MEDICARE

## 2024-05-15 VITALS — WEIGHT: 145 LBS | BODY MASS INDEX: 21.41 KG/M2

## 2024-05-15 DIAGNOSIS — Z09 ENCOUNTER FOR FOLLOW-UP EXAMINATION AFTER COMPLETED TREATMENT FOR CONDITIONS OTHER THAN MALIGNANT NEOPLASM: ICD-10-CM

## 2024-05-15 DIAGNOSIS — D86.85 CARDIAC SARCOIDOSIS: ICD-10-CM

## 2024-05-15 LAB — GLUCOSE SERPL-MCNC: 75 MG/DL (ref 70–99)

## 2024-05-15 RX ORDER — FLUDEOXYGLUCOSE F 18 200 MCI/ML
1-18 INJECTION, SOLUTION INTRAVENOUS ONCE
Status: COMPLETED | OUTPATIENT
Start: 2024-05-15 | End: 2024-05-15

## 2024-05-15 RX ADMIN — FLUDEOXYGLUCOSE F 18 15.88 MILLICURIE: 200 INJECTION, SOLUTION INTRAVENOUS at 11:18

## 2024-07-28 ENCOUNTER — HEALTH MAINTENANCE LETTER (OUTPATIENT)
Age: 79
End: 2024-07-28

## 2024-10-14 ENCOUNTER — ANCILLARY PROCEDURE (OUTPATIENT)
Dept: MRI IMAGING | Facility: CLINIC | Age: 79
End: 2024-10-14
Attending: UROLOGY
Payer: MEDICARE

## 2024-10-14 DIAGNOSIS — D49.0 IPMN (INTRADUCTAL PAPILLARY MUCINOUS NEOPLASM): ICD-10-CM

## 2024-10-14 PROCEDURE — 74183 MRI ABD W/O CNTR FLWD CNTR: CPT | Mod: MG | Performed by: STUDENT IN AN ORGANIZED HEALTH CARE EDUCATION/TRAINING PROGRAM

## 2024-10-14 PROCEDURE — A9585 GADOBUTROL INJECTION: HCPCS | Performed by: STUDENT IN AN ORGANIZED HEALTH CARE EDUCATION/TRAINING PROGRAM

## 2024-10-14 PROCEDURE — G1010 CDSM STANSON: HCPCS | Performed by: STUDENT IN AN ORGANIZED HEALTH CARE EDUCATION/TRAINING PROGRAM

## 2024-10-14 RX ORDER — GADOBUTROL 604.72 MG/ML
6.5 INJECTION INTRAVENOUS ONCE
Status: COMPLETED | OUTPATIENT
Start: 2024-10-14 | End: 2024-10-14

## 2024-10-14 RX ADMIN — GADOBUTROL 6.5 ML: 604.72 INJECTION INTRAVENOUS at 11:37

## 2025-05-13 NOTE — TELEPHONE ENCOUNTER
RECORDS STATUS - ALL OTHER DIAGNOSIS      Action    Action Taken 5/14/25  Imaging from [facility] received via hoohbe, pending resolution.  1:25 PM      RECORDS RECEIVED FROM: [facility]   DATE RECEIVED:    NOTES STATUS DETAILS   OFFICE NOTE from medical oncologist [facility] - [facility] Dr. Hakeem Deal: 4/15/25   MEDICATION LIST CE [facility]   LABS     PATHOLOGY REPORTS [facility], Reports in CE 3/12/25: [id number]   ANYTHING RELATED TO DIAGNOSIS Epic/CE 4/29/25   IMAGING (NEED IMAGES & REPORT)     CT SCANS Requested 5/13 [facility]  3/12/25, 3/9/25   XRAYS Requested 5/13 [facility]  3/11/25, 3/10/25, 3/9/25

## 2025-05-19 LAB
DAT POLY: NEGATIVE
SPECIMEN EXP DATE BLD: NORMAL

## 2025-05-20 ENCOUNTER — PRE VISIT (OUTPATIENT)
Dept: ONCOLOGY | Facility: CLINIC | Age: 80
End: 2025-05-20
Payer: MEDICARE

## 2025-05-20 ENCOUNTER — ONCOLOGY VISIT (OUTPATIENT)
Dept: ONCOLOGY | Facility: CLINIC | Age: 80
End: 2025-05-20
Attending: INTERNAL MEDICINE
Payer: MEDICARE

## 2025-05-20 VITALS
DIASTOLIC BLOOD PRESSURE: 80 MMHG | BODY MASS INDEX: 20.79 KG/M2 | WEIGHT: 140.8 LBS | SYSTOLIC BLOOD PRESSURE: 122 MMHG | HEART RATE: 65 BPM | OXYGEN SATURATION: 99 %

## 2025-05-20 DIAGNOSIS — D61.818 PANCYTOPENIA (H): ICD-10-CM

## 2025-05-20 DIAGNOSIS — D46.9 MDS (MYELODYSPLASTIC SYNDROME) (H): Primary | ICD-10-CM

## 2025-05-20 LAB
BASOPHILS # BLD AUTO: 0 10E3/UL (ref 0–0.2)
BASOPHILS NFR BLD AUTO: 1 %
EOSINOPHIL # BLD AUTO: 0 10E3/UL (ref 0–0.7)
EOSINOPHIL NFR BLD AUTO: 0 %
ERYTHROCYTE [DISTWIDTH] IN BLOOD BY AUTOMATED COUNT: 19.6 % (ref 10–15)
HAPTOGLOB SERPL-MCNC: 40 MG/DL (ref 30–200)
HCT VFR BLD AUTO: 26.4 % (ref 40–53)
HGB BLD-MCNC: 8.7 G/DL (ref 13.3–17.7)
IMM GRANULOCYTES # BLD: 0 10E3/UL
IMM GRANULOCYTES NFR BLD: 1 %
LYMPHOCYTES # BLD AUTO: 1 10E3/UL (ref 0.8–5.3)
LYMPHOCYTES NFR BLD AUTO: 15 %
MCH RBC QN AUTO: 41.6 PG (ref 26.5–33)
MCHC RBC AUTO-ENTMCNC: 33 G/DL (ref 31.5–36.5)
MCV RBC AUTO: 126 FL (ref 78–100)
MONOCYTES # BLD AUTO: 0.8 10E3/UL (ref 0–1.3)
MONOCYTES NFR BLD AUTO: 13 %
NEUTROPHILS # BLD AUTO: 4.8 10E3/UL (ref 1.6–8.3)
NEUTROPHILS NFR BLD AUTO: 72 %
NRBC # BLD AUTO: 0.1 10E3/UL
NRBC BLD AUTO-RTO: 1 /100
PLATELET # BLD AUTO: 55 10E3/UL (ref 150–450)
RBC # BLD AUTO: 2.09 10E6/UL (ref 4.4–5.9)
WBC # BLD AUTO: 6.7 10E3/UL (ref 4–11)

## 2025-05-20 PROCEDURE — 36415 COLL VENOUS BLD VENIPUNCTURE: CPT | Performed by: INTERNAL MEDICINE

## 2025-05-20 PROCEDURE — G2211 COMPLEX E/M VISIT ADD ON: HCPCS | Performed by: INTERNAL MEDICINE

## 2025-05-20 PROCEDURE — 86880 COOMBS TEST DIRECT: CPT | Performed by: INTERNAL MEDICINE

## 2025-05-20 PROCEDURE — G0463 HOSPITAL OUTPT CLINIC VISIT: HCPCS | Performed by: INTERNAL MEDICINE

## 2025-05-20 PROCEDURE — 88187 FLOWCYTOMETRY/READ 2-8: CPT | Performed by: STUDENT IN AN ORGANIZED HEALTH CARE EDUCATION/TRAINING PROGRAM

## 2025-05-20 PROCEDURE — 85025 COMPLETE CBC W/AUTO DIFF WBC: CPT | Performed by: INTERNAL MEDICINE

## 2025-05-20 PROCEDURE — 99204 OFFICE O/P NEW MOD 45 MIN: CPT | Performed by: INTERNAL MEDICINE

## 2025-05-20 PROCEDURE — 83010 ASSAY OF HAPTOGLOBIN QUANT: CPT | Performed by: INTERNAL MEDICINE

## 2025-05-20 PROCEDURE — 88185 FLOWCYTOMETRY/TC ADD-ON: CPT | Performed by: INTERNAL MEDICINE

## 2025-05-20 RX ORDER — SACUBITRIL AND VALSARTAN 24; 26 MG/1; MG/1
2 TABLET, FILM COATED ORAL 2 TIMES DAILY
COMMUNITY
Start: 2025-03-20

## 2025-05-20 RX ORDER — ASCORBIC ACID, THIAMINE, RIBOFLAVIN, NIACINAMIDE, PYRIDOXINE, FOLIC ACID, COBALAMIN, BIOTIN, PANTOTHENIC ACID 100; 1.5; 1.7; 20; 10; 1; 6; 300; 1 MG/1; MG/1; MG/1; MG/1; MG/1; MG/1; UG/1; UG/1; MG/1
1 TABLET, COATED ORAL EVERY MORNING
COMMUNITY
Start: 2024-11-19

## 2025-05-20 ASSESSMENT — PAIN SCALES - GENERAL: PAINLEVEL_OUTOF10: NO PAIN (0)

## 2025-05-20 NOTE — NURSING NOTE
"Oncology Rooming Note    May 20, 2025 11:06 AM   Ventura Newby is a 79 year old male who presents for:    Chief Complaint   Patient presents with    Oncology Clinic Visit     Initial Vitals: /80 (BP Location: Right arm)   Pulse 65   Wt 63.9 kg (140 lb 12.8 oz)   SpO2 99%   BMI 20.79 kg/m   Estimated body mass index is 20.79 kg/m  as calculated from the following:    Height as of 8/3/22: 1.753 m (5' 9\").    Weight as of this encounter: 63.9 kg (140 lb 12.8 oz). Body surface area is 1.76 meters squared.  No Pain (0) Comment: Data Unavailable   No LMP for male patient.  Allergies reviewed: Yes  Medications reviewed: Yes    Medications: Medication refills not needed today.  Pharmacy name entered into EPIC: [facility] [place] [address] [address] [place] [address]    Frailty Screening:   Is the patient here for a new oncology consult visit in cancer care? 2. No    PHQ9:  Did this patient require a PHQ9?: No      Clinical concerns: Patient will discuss concerns with provider.       Harley Hernandez MA            "

## 2025-05-20 NOTE — Clinical Note
2025      Ventura Newby  [address]  [place] [facility] [place] [address]      Dear Colleague,    Thank you for referring your patient, Ventura Newby, to the [facility]. Please see a copy of my visit note below.    [facility] Hematology / Oncology  Progress Note  Name: Ventura Newby  :  1945  MRN:  [id number]    --------------------    Assessment / Plan:  ***      There are no Patient Instructions on file for this visit.    --------------------    Interval History:  Ventura presents for consultation regarding newly-diagnosed ***    --------------------    Review of Systems:  10 point ROS negative except for that above.    Past Medical / Surgical History:  Past Medical History:   Diagnosis Date    Cancer (H)     Stage 3a chronic kidney disease (H) 2022     Past Surgical History:   Procedure Laterality Date    DAVINCI NEPHRECTOMY PARTIAL Right 8/3/2022    Procedure: ROBOTIC ASSISTED LAPAROSCOPIC RIGHT PARTIAL NEPHRECTOMY WITH INTRAOPERATIVE RENAL ULTRASOUND;  Surgeon: Khurram Escobedo MD;  Location: [facility] OR    HERNIA REPAIR      IR CVC TUNNEL W2 CATH W/O PORT  2022    IR CVC TUNNEL W2 CATH W/O PORT  2023    IR DIALYSIS FISTULOGRAM RIGHT  2023    IR DIALYSIS FISTULOGRAM RIGHT  2023    IR DIALYSIS FISTULOGRAM RIGHT  2023    IR DIALYSIS FISTULOGRAM RIGHT  9/3/2024    IR DIALYSIS FISTULOGRAM RIGHT  2024    IR DIALYSIS FISTULOGRAM RIGHT  2024    IR THORACENTESIS  10/26/2022    ORTHOPEDIC SURGERY      PROSTATECTOMY       Patient Active Problem List   Diagnosis    Malignant neoplasm of right kidney, except renal pelvis (H)    Stage 3a chronic kidney disease (H)    Clear cell renal cell carcinoma, right (H)       Family History:  No family history on file.    Social History:  Social History     Tobacco Use    Smoking status: Never    Smokeless tobacco: Never   Vaping Use    Vaping status: Never Used   Substance Use Topics    Alcohol use: Yes      "Comment: \"very limited\"    Drug use: Never       Medications / Allergies:  Reviewed in EMR.    --------------------    Physical Exam:  VS: There were no vitals taken for this visit.  GEN: Well appearing.    Labs / Imaging / Path:  Reviewed {LABS}.  Reviewed {IMAGING}.    [facility] Hematology / Oncology  Progress Note  Name: Ventura Newby  :  1945  MRN:  [id number]    --------------------    Assessment / Plan:  Myelodysplastic syndrome, low RIPSS; normal cytogenetics; DNMT3a mutation NGS.  Anemia secondary to above.  Thrombocytopenia secondary to above.    Systolic heart failure with ejection fraction 35 to 40%.  Mitral valve regurgitation status post clip placement.  End-stage renal disease on hemodialysis 3 times weekly.  Ongoing parenteral iron.  Ongoing ZOEY based therapy.    Over the course of our visit, Ventura, his family and I reviewed the natural history of what appears to be myelodysplastic syndrome.      There are no Patient Instructions on file for this visit.    --------------------    Interval History:  Ventura presents for consultation regarding myelodysplastic syndrome.  He is accompanied today by his family including wife Charlotte and daughter-in-law Reina.  He lives locally.  He does suffer from a number of chronic health conditions including heart failure with reduced ejection fraction, mitral valve clip placement for mitral valve regurg, end-stage renal disease on dialysis requiring ZOEY therapy and iron deficiency anemia.  As part of an evaluation for worsening anemia and thrombocytopenia down in [facility], he underwent a bone marrow biopsy that revealed cellularity 20 to 30% with normal trilineage hematopoiesis and some evidence of dyspoiesis and no increase in blasts.  Adequate iron was noted.  Cytogenetics were normal.  NGS revealed a DN M T3a mutation.  Other blood testing included elevated folic acid levels, normal B12 levels, ferritin of 600 sed rate of 4, symmetrically " "elevated Of 50.  6 and a lambda of 71 with a normal ratio of 0.71, no M spike on serum protein electrophoresis but acute phase reactant affect present, elevated LDH, undetectable haptoglobin, normal copper, negative CLAUDIA and a peripheral blood smear with macrocytic anemia with occasional nucleated red blood cells and thrombocytopenia with no significant schistocytes dysplasia or circulating blasts.    --------------------    Review of Systems:  10 point ROS negative except for that above.    Past Medical / Surgical History:  Past Medical History:   Diagnosis Date     Cancer (H)      Stage 3a chronic kidney disease (H) 04/07/2022     Past Surgical History:   Procedure Laterality Date     DAVINCI NEPHRECTOMY PARTIAL Right 8/3/2022    Procedure: ROBOTIC ASSISTED LAPAROSCOPIC RIGHT PARTIAL NEPHRECTOMY WITH INTRAOPERATIVE RENAL ULTRASOUND;  Surgeon: Khurram Escobedo MD;  Location: [facility] OR     HERNIA REPAIR       IR CVC TUNNEL W2 CATH W/O PORT  11/14/2022     IR CVC TUNNEL W2 CATH W/O PORT  6/22/2023     IR DIALYSIS FISTULOGRAM RIGHT  2/20/2023     IR DIALYSIS FISTULOGRAM RIGHT  6/22/2023     IR DIALYSIS FISTULOGRAM RIGHT  11/24/2023     IR DIALYSIS FISTULOGRAM RIGHT  9/3/2024     IR DIALYSIS FISTULOGRAM RIGHT  7/12/2024     IR DIALYSIS FISTULOGRAM RIGHT  12/9/2024     IR THORACENTESIS  10/26/2022     ORTHOPEDIC SURGERY       PROSTATECTOMY       Patient Active Problem List   Diagnosis     Malignant neoplasm of right kidney, except renal pelvis (H)     Stage 3a chronic kidney disease (H)     Clear cell renal cell carcinoma, right (H)       Family History:  No family history on file.    Social History:  Social History     Tobacco Use     Smoking status: Never     Smokeless tobacco: Never   Vaping Use     Vaping status: Never Used   Substance Use Topics     Alcohol use: Yes     Comment: \"very limited\"     Drug use: Never       Medications / Allergies:  Reviewed in EMR.    --------------------    Physical Exam:  VS: There were no " vitals taken for this visit.  GEN: Well appearing.    Labs / Imaging / Path:  Reviewed {LABS}.  Reviewed {IMAGING}.      Again, thank you for allowing me to participate in the care of your patient.        Sincerely,        Khurram Sifuentes MD    Electronically signed

## 2025-05-20 NOTE — PROGRESS NOTES
[facility] Hematology / Oncology  Progress Note  Name: Ventura Newby  :  1945  MRN:  [id number]    --------------------    Assessment / Plan:  Myelodysplastic syndrome, low RIPSS; normal cytogenetics; DNMT3a mutation NGS.  Anemia secondary to above.  Thrombocytopenia secondary to above.    Systolic heart failure with ejection fraction 35 to 40%.  Mitral valve regurgitation status post clip placement.  End-stage renal disease on hemodialysis 3 times weekly.  Ongoing parenteral iron.  Ongoing ZOEY based therapy.    Over the course of our visit, Ventura, his family and I reviewed the natural history of what appears to be myelodysplastic syndrome.  We reviewed the pathology report of his bone marrow biopsy, cytogenetic results, NGS results and ultimately arriving it would looks like an low R IPSS score.  We will request that his bone marrow biopsy be collected and reread by our pathologist to ensure that there is not some component of ICUS/CCUS.  As we discussed the ultimate treatment for underlying MDS with symptomatic anemia is initiation of either ZOEY based therapy or luspatercept.  As he is already dealing with end-stage renal disease he does already receive ZOEY based therapy and parenteral iron.  I will message his nephrologist as to whether additional dosing or frequency could be provided to try to arrive at a hemoglobin around 10-1/2-11-1/2.  If he is not felt to be responding appropriately to ZOEY best therapy, we could consider luspatercept as well for his underlying MDS.  We did discuss that right now there is no need to initiate therapy for his thrombocytopenia although this could be reconsidered if he has plans for any procedures or surgeries upcoming.  If that does happen we would sparingly consider the use of thrombopoietin, trying to avoid when possible with MDS.  We will plan to draw some labs today including rechecking his hemoglobin with a CBC as well as some hemolysis markers such as PNH  and immune causes given the low haptoglobin although certainly this could be mechanical in nature with mitral valve regurgitation.  Once we communicate with his nephrologist and have a good plan, we anticipate we will see him back in 3 months time.      Patient Instructions   1) Please submit path request to collect outside bone marrow biopsy and review.  2) BJT 3 months w/ labs (CBC, ferritin, ESR).    Khurram Sifuentes MD.    --------------------    Interval History:  Ventura presents for consultation regarding myelodysplastic syndrome.  He is accompanied today by his family including wife Charlotte and daughter-in-law Reina.  He lives locally.  He does suffer from a number of chronic health conditions including heart failure with reduced ejection fraction, mitral valve clip placement for mitral valve regurg, end-stage renal disease on dialysis requiring ZOEY therapy and iron deficiency anemia.  As part of an evaluation for worsening anemia and thrombocytopenia down in [facility], he underwent a bone marrow biopsy that revealed cellularity 20 to 30% with normal trilineage hematopoiesis and some evidence of dyspoiesis and no increase in blasts.  Adequate iron was noted.  Cytogenetics were normal.  NGS revealed a DN M T3a mutation.  Other blood testing included elevated folic acid levels, normal B12 levels, ferritin of 600 sed rate of 4, symmetrically elevated Of 50.  6 and a lambda of 71 with a normal ratio of 0.71, no M spike on serum protein electrophoresis but acute phase reactant affect present, elevated LDH, undetectable haptoglobin, normal copper, negative CLAUDIA and a peripheral blood smear with macrocytic anemia with occasional nucleated red blood cells and thrombocytopenia with no significant schistocytes dysplasia or circulating blasts.    --------------------    Review of Systems:  10 point ROS negative except for that above.    Past Medical / Surgical History:  Past Medical History:   Diagnosis Date     "Cancer (H)     Stage 3a chronic kidney disease (H) 04/07/2022     Past Surgical History:   Procedure Laterality Date    DAVINCI NEPHRECTOMY PARTIAL Right 8/3/2022    Procedure: ROBOTIC ASSISTED LAPAROSCOPIC RIGHT PARTIAL NEPHRECTOMY WITH INTRAOPERATIVE RENAL ULTRASOUND;  Surgeon: Khurram Escobedo MD;  Location: [facility] OR    HERNIA REPAIR      IR CVC TUNNEL W2 CATH W/O PORT  11/14/2022    IR CVC TUNNEL W2 CATH W/O PORT  6/22/2023    IR DIALYSIS FISTULOGRAM RIGHT  2/20/2023    IR DIALYSIS FISTULOGRAM RIGHT  6/22/2023    IR DIALYSIS FISTULOGRAM RIGHT  11/24/2023    IR DIALYSIS FISTULOGRAM RIGHT  9/3/2024    IR DIALYSIS FISTULOGRAM RIGHT  7/12/2024    IR DIALYSIS FISTULOGRAM RIGHT  12/9/2024    IR THORACENTESIS  10/26/2022    ORTHOPEDIC SURGERY      PROSTATECTOMY       Patient Active Problem List   Diagnosis    Malignant neoplasm of right kidney, except renal pelvis (H)    Stage 3a chronic kidney disease (H)    Clear cell renal cell carcinoma, right (H)       Family History:  No family history on file.    Social History:  Social History     Tobacco Use    Smoking status: Never    Smokeless tobacco: Never   Vaping Use    Vaping status: Never Used   Substance Use Topics    Alcohol use: Yes     Comment: \"very limited\"    Drug use: Never       Medications / Allergies:  Reviewed in EMR.    --------------------    Physical Exam:  VS: /80 (BP Location: Right arm)   Pulse 65   Wt 63.9 kg (140 lb 12.8 oz)   SpO2 99%   BMI 20.79 kg/m    GEN: Well appearing.    Labs / Imaging / Path:  Reviewed CBC, CMP, marrow morphology, marrow cytogenetics, marrow NGS, B12, folate, haptoglobin, LDH, SPEP, FLC.  "

## 2025-05-20 NOTE — PATIENT INSTRUCTIONS
1) Please submit path request to collect outside bone marrow biopsy and review.  2) BJT 3 months w/ labs (CBC, ferritin, ESR).    Khurram Sifuentes MD.

## 2025-05-21 ENCOUNTER — RESULTS FOLLOW-UP (OUTPATIENT)
Dept: ONCOLOGY | Facility: CLINIC | Age: 80
End: 2025-05-21

## 2025-05-21 LAB
PATH REPORT.COMMENTS IMP SPEC: NORMAL
PATH REPORT.FINAL DX SPEC: NORMAL
PATH REPORT.MICROSCOPIC SPEC OTHER STN: NORMAL
PATH REPORT.RELEVANT HX SPEC: NORMAL

## 2025-05-29 ENCOUNTER — LAB (OUTPATIENT)
Dept: LAB | Facility: CLINIC | Age: 80
End: 2025-05-29
Payer: MEDICARE

## 2025-05-29 DIAGNOSIS — D46.9 MYELODYSPLASTIC SYNDROME (H): Primary | ICD-10-CM

## (undated) DEVICE — GLOVE PROTEXIS BLUE W/NEU-THERA 8.0  2D73EB80

## (undated) DEVICE — DAVINCI XI NDL DRIVER LARGE 470006

## (undated) DEVICE — TUBING CONMED AIRSEAL SMOKE EVAC INSUFFLATION ASM-EVAC

## (undated) DEVICE — DAVINCI XI DRAPE COLUMN 470341

## (undated) DEVICE — DAVINCI XI MONOPOLAR SCISSORS HOT SHEARS 8MM 470179

## (undated) DEVICE — CATH TRAY FOLEY COUDE SURESTEP 16FR W/URNE MTR STLK A304716A

## (undated) DEVICE — SU VICRYL 0 CT-1 27" UND J260H

## (undated) DEVICE — ADH SKIN CLOSURE PREMIERPRO EXOFIN 1.0ML 3470

## (undated) DEVICE — ESU GROUND PAD ADULT W/CORD E7507

## (undated) DEVICE — SU PDS II 0 CT-2 27" Z334H

## (undated) DEVICE — SOL NACL 0.9% INJ 1000ML BAG 2B1324X

## (undated) DEVICE — GLOVE PROTEXIS W/NEU-THERA 7.5  2D73TE75

## (undated) DEVICE — NDL INSUFFLATION 13GA 120MM C2201

## (undated) DEVICE — TAPE DURAPORE 3" SILK 1538-3

## (undated) DEVICE — DAVINCI HOT SHEARS TIP COVER  400180

## (undated) DEVICE — TROCAR AIRSEAL BLADELESS 12X120MM IAS12-120

## (undated) DEVICE — SU VICRYL 0 CT-1 27" J340H

## (undated) DEVICE — SU VICRYL 2-0 CT-2 27" UND J269H

## (undated) DEVICE — SUCTION IRR STRYKERFLOW II W/TIP 250-070-520

## (undated) DEVICE — ENDO POUCH UNIV RETRIEVAL SYSTEM INZII 10MM CD001

## (undated) DEVICE — SU SILK 2-0 FS-1 18" 685G

## (undated) DEVICE — SU MONOCRYL 4-0 PS-2 18" UND Y496G

## (undated) DEVICE — DAVINCI XI DRAPE ARM 470015

## (undated) DEVICE — LINEN TOWEL PACK X5 5464

## (undated) DEVICE — ENDO TROCAR FIRST ENTRY KII FIOS Z-THRD 05X100MM CTF03

## (undated) DEVICE — DRAIN JACKSON PRATT RESERVOIR 100ML SU130-1305

## (undated) DEVICE — CLIP ENDO HEMO-LOC PURPLE LG 544240

## (undated) DEVICE — SOL NACL 0.9% IRRIG 1000ML BOTTLE 2F7124

## (undated) DEVICE — DRAPE MAYO STAND 23X54 8337

## (undated) DEVICE — SYR 10ML FINGER CONTROL W/O NDL 309695

## (undated) DEVICE — DRAIN JACKSON PRATT CHANNEL 19FR ROUND HUBLESS SIL JP-2230

## (undated) DEVICE — GLOVE PROTEXIS W/NEU-THERA 6.5  2D73TE65

## (undated) DEVICE — PACK DAVINCI UROLOGY SBA15UDFSG

## (undated) DEVICE — DAVINCI XI SEAL UNIVERSAL 5-8MM 470361

## (undated) DEVICE — BLADE KNIFE SURG 10 371110

## (undated) DEVICE — DAVINCI XI GRASPER ENDOWRIST PROGRASP 470093

## (undated) DEVICE — SU VICRYL 0 TIE 12X18" J906G

## (undated) DEVICE — SOL WATER IRRIG 1000ML BOTTLE 2F7114

## (undated) DEVICE — SYSTEM LAPAROVUE VISIBILITY LAPVUE10

## (undated) RX ORDER — LIDOCAINE HYDROCHLORIDE 20 MG/ML
INJECTION, SOLUTION EPIDURAL; INFILTRATION; INTRACAUDAL; PERINEURAL
Status: DISPENSED
Start: 2022-08-03

## (undated) RX ORDER — DEXAMETHASONE SODIUM PHOSPHATE 4 MG/ML
INJECTION, SOLUTION INTRA-ARTICULAR; INTRALESIONAL; INTRAMUSCULAR; INTRAVENOUS; SOFT TISSUE
Status: DISPENSED
Start: 2022-08-03

## (undated) RX ORDER — BUPIVACAINE HYDROCHLORIDE 2.5 MG/ML
INJECTION, SOLUTION EPIDURAL; INFILTRATION; INTRACAUDAL
Status: DISPENSED
Start: 2022-08-03

## (undated) RX ORDER — DEXMEDETOMIDINE HYDROCHLORIDE 4 UG/ML
INJECTION, SOLUTION INTRAVENOUS
Status: DISPENSED
Start: 2022-08-03

## (undated) RX ORDER — ACETAMINOPHEN 500 MG
TABLET ORAL
Status: DISPENSED
Start: 2022-08-03

## (undated) RX ORDER — GLYCOPYRROLATE 0.2 MG/ML
INJECTION, SOLUTION INTRAMUSCULAR; INTRAVENOUS
Status: DISPENSED
Start: 2022-08-03

## (undated) RX ORDER — ONDANSETRON 2 MG/ML
INJECTION INTRAMUSCULAR; INTRAVENOUS
Status: DISPENSED
Start: 2022-08-03

## (undated) RX ORDER — ETOMIDATE 2 MG/ML
INJECTION INTRAVENOUS
Status: DISPENSED
Start: 2022-08-03

## (undated) RX ORDER — PROPOFOL 10 MG/ML
INJECTION, EMULSION INTRAVENOUS
Status: DISPENSED
Start: 2022-08-03

## (undated) RX ORDER — FENTANYL CITRATE 50 UG/ML
INJECTION, SOLUTION INTRAMUSCULAR; INTRAVENOUS
Status: DISPENSED
Start: 2022-08-03

## (undated) RX ORDER — CEFAZOLIN SODIUM/WATER 2 G/20 ML
SYRINGE (ML) INTRAVENOUS
Status: DISPENSED
Start: 2022-08-03

## (undated) RX ORDER — HYDROMORPHONE HYDROCHLORIDE 1 MG/ML
INJECTION, SOLUTION INTRAMUSCULAR; INTRAVENOUS; SUBCUTANEOUS
Status: DISPENSED
Start: 2022-08-03